# Patient Record
Sex: MALE | Race: WHITE | NOT HISPANIC OR LATINO | URBAN - METROPOLITAN AREA
[De-identification: names, ages, dates, MRNs, and addresses within clinical notes are randomized per-mention and may not be internally consistent; named-entity substitution may affect disease eponyms.]

---

## 2021-07-27 ENCOUNTER — EMERGENCY (EMERGENCY)
Facility: HOSPITAL | Age: 81
LOS: 1 days | Discharge: HOME | End: 2021-07-27
Attending: EMERGENCY MEDICINE | Admitting: EMERGENCY MEDICINE
Payer: MEDICARE

## 2021-07-27 VITALS
HEART RATE: 67 BPM | DIASTOLIC BLOOD PRESSURE: 93 MMHG | OXYGEN SATURATION: 95 % | TEMPERATURE: 98 F | RESPIRATION RATE: 18 BRPM | SYSTOLIC BLOOD PRESSURE: 190 MMHG

## 2021-07-27 VITALS
SYSTOLIC BLOOD PRESSURE: 194 MMHG | TEMPERATURE: 98 F | OXYGEN SATURATION: 97 % | RESPIRATION RATE: 20 BRPM | HEART RATE: 70 BPM | DIASTOLIC BLOOD PRESSURE: 75 MMHG

## 2021-07-27 DIAGNOSIS — G20 PARKINSON'S DISEASE: ICD-10-CM
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
## 2021-07-27 DIAGNOSIS — Z23 ENCOUNTER FOR IMMUNIZATION: ICD-10-CM

## 2021-07-27 DIAGNOSIS — I10 ESSENTIAL (PRIMARY) HYPERTENSION: ICD-10-CM

## 2021-07-27 DIAGNOSIS — S51.012A LACERATION WITHOUT FOREIGN BODY OF LEFT ELBOW, INITIAL ENCOUNTER: ICD-10-CM
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
## 2021-07-27 PROCEDURE — 99283 EMERGENCY DEPT VISIT LOW MDM: CPT | Mod: 25

## 2021-07-27 PROCEDURE — 90471 IMMUNIZATION ADMIN: CPT

## 2021-07-27 PROCEDURE — 13121 CMPLX RPR S/A/L 2.6-7.5 CM: CPT

## 2021-07-27 PROCEDURE — 90715 TDAP VACCINE 7 YRS/> IM: CPT

## 2021-07-27 PROCEDURE — 73080 X-RAY EXAM OF ELBOW: CPT

## 2021-07-27 PROCEDURE — 73080 X-RAY EXAM OF ELBOW: CPT | Mod: 26,LT

## 2021-07-27 PROCEDURE — 12002 RPR S/N/AX/GEN/TRNK2.6-7.5CM: CPT

## 2021-07-27 RX ORDER — TETANUS TOXOID, REDUCED DIPHTHERIA TOXOID AND ACELLULAR PERTUSSIS VACCINE, ADSORBED 5; 2.5; 8; 8; 2.5 [IU]/.5ML; [IU]/.5ML; UG/.5ML; UG/.5ML; UG/.5ML
0.5 SUSPENSION INTRAMUSCULAR ONCE
Refills: 0 | Status: COMPLETED | OUTPATIENT
Start: 2021-07-27 | End: 2021-07-27

## 2021-07-27 RX ADMIN — TETANUS TOXOID, REDUCED DIPHTHERIA TOXOID AND ACELLULAR PERTUSSIS VACCINE, ADSORBED 0.5 MILLILITER(S): 5; 2.5; 8; 8; 2.5 SUSPENSION INTRAMUSCULAR at 17:11

## 2021-07-27 NOTE — ED PROVIDER NOTE - CLINICAL SUMMARY MEDICAL DECISION MAKING FREE TEXT BOX
81 y/o m hx Parkinson's presents c/o mechanical fall today with left elbow lac; ext NVI, no head trauma, xray neg.  Tetanus updated, lac repaired, will d/c with wound care instructions, f/u in 2 weeks for suture removal

## 2021-07-27 NOTE — ED PROVIDER NOTE - ATTENDING CONTRIBUTION TO CARE
79 yo M with hx of Parkinsons (uses walker but noncompliant) accompanied by wife for evaluation of fall that occurred prior to arrival while attempting to button pants by self in bathroom landing on L elbow.  No head strike, other injury, trauma or pain.  L elbow shows lateral posterior L shaped 4cm lac, superficial to subcut tissue.  Mild ttp to elbow.  All other bony prominences and nontender.  No signs of ich.  FROM neck, no spine ttp.  Plan UD TDAP, xray and repair wounds.  Pt appears to have enough ambulation assistance for safe dc.

## 2021-07-27 NOTE — ED ADULT NURSE NOTE - NS ED NURSE DC INFO COMPLEXITY
may take Tylenol as needed for groin soreness; if pain persists or worsens or is associated with other symptoms please contact your cardiologist
Simple: Patient demonstrates quick and easy understanding/Verbalized Understanding

## 2021-07-27 NOTE — ED PROVIDER NOTE - MUSCULOSKELETAL, MLM
left elbow +4 cm L shaped laceration to posterolateral aspect with +mild surrounding tenderness, no swelling or deformity, flexion/extension and supination/pronation of left elbow intact, radial pulse 2+, strength 5/5, sensation intact distally

## 2021-07-27 NOTE — ED PROVIDER NOTE - PATIENT PORTAL LINK FT
You can access the FollowMyHealth Patient Portal offered by [facility] by registering at the following website: [email or web address]. By joining [employer]’s FollowMyHealth portal, you will also be able to view your health information using other applications (apps) compatible with our system.

## 2021-07-27 NOTE — ED ADULT NURSE NOTE - OBJECTIVE STATEMENT
Pt reports fall today in bathroom "because of my Parkinson's" pt denies hitting head, no loc. Pt has skin tear to left elbow.

## 2021-07-27 NOTE — ED PROVIDER NOTE - OBJECTIVE STATEMENT
Patient is an 79 y/o male with PMHx of Parkinson's and HTN presenting with left elbow laceration from a fall this afternoon. Pt states he was standing in the bathroom when he lost his balance, fell backwards and hit his left elbow only on the door hinge. Pt reports unsteady gait as his baseline for several years due to Parkinson's. Pt's wife states this is the 3rd fall in 2 months due to unsteadiness, hit his head during the prior falls and did not go to the ED. Denies dizziness, syncope, fever/chills, head injury, headache, general pain, vision abnormalities, chest pain or SOB Patient is an 79 y/o male with PMHx of Parkinson's and HTN presenting with left elbow laceration from a fall this afternoon. Pt states he was standing in the bathroom when he lost his balance, fell backwards and hit his left elbow only on the door hinge. Pt reports unsteady gait as his baseline for several years due to Parkinson's. Pt's wife states this is the 3rd fall in 2 months due to unsteadiness, hit his head during the prior falls and did not go to the ED. Denies dizziness, syncope, fever/chills, head injury, headache, general pain, vision abnormalities, chest pain or SOB, all other ROS negative.

## 2021-08-24 ENCOUNTER — INPATIENT (INPATIENT)
Facility: HOSPITAL | Age: 81
LOS: 2 days | Discharge: HOME | DRG: 917 | End: 2021-08-27
Attending: HOSPITALIST | Admitting: STUDENT IN AN ORGANIZED HEALTH CARE EDUCATION/TRAINING PROGRAM
Payer: MEDICARE

## 2021-08-24 VITALS
DIASTOLIC BLOOD PRESSURE: 84 MMHG | RESPIRATION RATE: 18 BRPM | HEART RATE: 57 BPM | TEMPERATURE: 98 F | SYSTOLIC BLOOD PRESSURE: 153 MMHG | OXYGEN SATURATION: 98 %

## 2021-08-24 LAB
ALBUMIN SERPL ELPH-MCNC: 3.7 G/DL — SIGNIFICANT CHANGE UP (ref 3.3–5)
ALP SERPL-CCNC: 272 U/L — HIGH (ref 40–120)
ALT FLD-CCNC: 377 U/L — HIGH (ref 10–45)
AMMONIA BLD-MCNC: 24 UMOL/L — SIGNIFICANT CHANGE UP (ref 11–55)
AMPHET UR-MCNC: NEGATIVE — SIGNIFICANT CHANGE UP
AMYLASE P1 CFR SERPL: 625 U/L — HIGH (ref 25–125)
ANION GAP SERPL CALC-SCNC: 4 MMOL/L — LOW (ref 5–17)
APPEARANCE UR: CLEAR — SIGNIFICANT CHANGE UP
APTT BLD: 28 SEC — SIGNIFICANT CHANGE UP (ref 27.5–35.5)
AST SERPL-CCNC: 496 U/L — HIGH (ref 10–40)
BACTERIA # UR AUTO: PRESENT /HPF
BARBITURATES UR SCN-MCNC: NEGATIVE — SIGNIFICANT CHANGE UP
BASE EXCESS BLDV CALC-SCNC: 1.6 MMOL/L — SIGNIFICANT CHANGE UP (ref -2–3)
BASE EXCESS BLDV CALC-SCNC: 2.2 MMOL/L — SIGNIFICANT CHANGE UP (ref -2–3)
BASOPHILS # BLD AUTO: 0.04 K/UL — SIGNIFICANT CHANGE UP (ref 0–0.2)
BASOPHILS NFR BLD AUTO: 0.6 % — SIGNIFICANT CHANGE UP (ref 0–2)
BENZODIAZ UR-MCNC: POSITIVE
BILIRUB SERPL-MCNC: 3.3 MG/DL — HIGH (ref 0.2–1.2)
BILIRUB UR-MCNC: ABNORMAL
BLD GP AB SCN SERPL QL: NEGATIVE — SIGNIFICANT CHANGE UP
BUN SERPL-MCNC: 20 MG/DL — SIGNIFICANT CHANGE UP (ref 7–23)
CA-I SERPL-SCNC: 1.25 MMOL/L — SIGNIFICANT CHANGE UP (ref 1.15–1.33)
CALCIUM SERPL-MCNC: 9.5 MG/DL — SIGNIFICANT CHANGE UP (ref 8.4–10.5)
CHLORIDE SERPL-SCNC: 106 MMOL/L — SIGNIFICANT CHANGE UP (ref 96–108)
CHOLEST SERPL-MCNC: 183 MG/DL — SIGNIFICANT CHANGE UP
CO2 BLDV-SCNC: 29.8 MMOL/L — HIGH (ref 22–26)
CO2 BLDV-SCNC: 31 MMOL/L — HIGH (ref 22–26)
CO2 SERPL-SCNC: 28 MMOL/L — SIGNIFICANT CHANGE UP (ref 22–31)
COCAINE METAB.OTHER UR-MCNC: NEGATIVE — SIGNIFICANT CHANGE UP
COLOR SPEC: YELLOW — SIGNIFICANT CHANGE UP
CREAT SERPL-MCNC: 1.09 MG/DL — SIGNIFICANT CHANGE UP (ref 0.5–1.3)
DIFF PNL FLD: NEGATIVE — SIGNIFICANT CHANGE UP
EOSINOPHIL # BLD AUTO: 0.26 K/UL — SIGNIFICANT CHANGE UP (ref 0–0.5)
EOSINOPHIL NFR BLD AUTO: 4.1 % — SIGNIFICANT CHANGE UP (ref 0–6)
EPI CELLS # UR: SIGNIFICANT CHANGE UP /HPF (ref 0–5)
ETHANOL SERPL-MCNC: <10 MG/DL — SIGNIFICANT CHANGE UP (ref 0–10)
GAS PNL BLDV: 138 MMOL/L — SIGNIFICANT CHANGE UP (ref 136–145)
GAS PNL BLDV: SIGNIFICANT CHANGE UP
GAS PNL BLDV: SIGNIFICANT CHANGE UP
GLUCOSE SERPL-MCNC: 104 MG/DL — HIGH (ref 70–99)
GLUCOSE UR QL: NEGATIVE — SIGNIFICANT CHANGE UP
HCO3 BLDV-SCNC: 28 MMOL/L — SIGNIFICANT CHANGE UP (ref 22–29)
HCO3 BLDV-SCNC: 29 MMOL/L — SIGNIFICANT CHANGE UP (ref 22–29)
HCT VFR BLD CALC: 41.6 % — SIGNIFICANT CHANGE UP (ref 39–50)
HDLC SERPL-MCNC: 62 MG/DL — SIGNIFICANT CHANGE UP
HGB BLD-MCNC: 13.4 G/DL — SIGNIFICANT CHANGE UP (ref 13–17)
IMM GRANULOCYTES NFR BLD AUTO: 0.5 % — SIGNIFICANT CHANGE UP (ref 0–1.5)
INR BLD: 0.97 — SIGNIFICANT CHANGE UP (ref 0.88–1.16)
KETONES UR-MCNC: NEGATIVE — SIGNIFICANT CHANGE UP
LACTATE SERPL-SCNC: 0.9 MMOL/L — SIGNIFICANT CHANGE UP (ref 0.5–2)
LEUKOCYTE ESTERASE UR-ACNC: NEGATIVE — SIGNIFICANT CHANGE UP
LIDOCAIN IGE QN: 1065 U/L — HIGH (ref 7–60)
LIPID PNL WITH DIRECT LDL SERPL: 103 MG/DL — HIGH
LYMPHOCYTES # BLD AUTO: 0.81 K/UL — LOW (ref 1–3.3)
LYMPHOCYTES # BLD AUTO: 12.8 % — LOW (ref 13–44)
MCHC RBC-ENTMCNC: 30.6 PG — SIGNIFICANT CHANGE UP (ref 27–34)
MCHC RBC-ENTMCNC: 32.2 GM/DL — SIGNIFICANT CHANGE UP (ref 32–36)
MCV RBC AUTO: 95 FL — SIGNIFICANT CHANGE UP (ref 80–100)
METHADONE UR-MCNC: NEGATIVE — SIGNIFICANT CHANGE UP
MONOCYTES # BLD AUTO: 0.6 K/UL — SIGNIFICANT CHANGE UP (ref 0–0.9)
MONOCYTES NFR BLD AUTO: 9.4 % — SIGNIFICANT CHANGE UP (ref 2–14)
NEUTROPHILS # BLD AUTO: 4.61 K/UL — SIGNIFICANT CHANGE UP (ref 1.8–7.4)
NEUTROPHILS NFR BLD AUTO: 72.6 % — SIGNIFICANT CHANGE UP (ref 43–77)
NITRITE UR-MCNC: POSITIVE
NON HDL CHOLESTEROL: 121 MG/DL — SIGNIFICANT CHANGE UP
NRBC # BLD: 0 /100 WBCS — SIGNIFICANT CHANGE UP (ref 0–0)
OPIATES UR-MCNC: NEGATIVE — SIGNIFICANT CHANGE UP
PCO2 BLDV: 49 MMHG — SIGNIFICANT CHANGE UP (ref 42–55)
PCO2 BLDV: 58 MMHG — HIGH (ref 42–55)
PCP SPEC-MCNC: SIGNIFICANT CHANGE UP
PCP UR-MCNC: NEGATIVE — SIGNIFICANT CHANGE UP
PH BLDV: 7.31 — LOW (ref 7.32–7.43)
PH BLDV: 7.37 — SIGNIFICANT CHANGE UP (ref 7.32–7.43)
PH UR: 7 — SIGNIFICANT CHANGE UP (ref 5–8)
PLATELET # BLD AUTO: 163 K/UL — SIGNIFICANT CHANGE UP (ref 150–400)
PO2 BLDV: <35 MMHG — LOW (ref 25–45)
PO2 BLDV: <35 MMHG — SIGNIFICANT CHANGE UP (ref 25–45)
POTASSIUM BLDV-SCNC: 4.4 MMOL/L — SIGNIFICANT CHANGE UP (ref 3.5–5.1)
POTASSIUM SERPL-MCNC: 4.4 MMOL/L — SIGNIFICANT CHANGE UP (ref 3.5–5.3)
POTASSIUM SERPL-SCNC: 4.4 MMOL/L — SIGNIFICANT CHANGE UP (ref 3.5–5.3)
PROT SERPL-MCNC: 6.8 G/DL — SIGNIFICANT CHANGE UP (ref 6–8.3)
PROT UR-MCNC: NEGATIVE MG/DL — SIGNIFICANT CHANGE UP
PROTHROM AB SERPL-ACNC: 11.6 SEC — SIGNIFICANT CHANGE UP (ref 10.6–13.6)
RBC # BLD: 4.38 M/UL — SIGNIFICANT CHANGE UP (ref 4.2–5.8)
RBC # FLD: 12.9 % — SIGNIFICANT CHANGE UP (ref 10.3–14.5)
RBC CASTS # UR COMP ASSIST: < 5 /HPF — SIGNIFICANT CHANGE UP
RH IG SCN BLD-IMP: NEGATIVE — SIGNIFICANT CHANGE UP
SAO2 % BLDV: 31.5 % — LOW (ref 67–88)
SAO2 % BLDV: 54.5 % — LOW (ref 67–88)
SARS-COV-2 RNA SPEC QL NAA+PROBE: SIGNIFICANT CHANGE UP
SODIUM SERPL-SCNC: 138 MMOL/L — SIGNIFICANT CHANGE UP (ref 135–145)
SP GR SPEC: 1.01 — SIGNIFICANT CHANGE UP (ref 1–1.03)
THC UR QL: NEGATIVE — SIGNIFICANT CHANGE UP
TRIGL SERPL-MCNC: 92 MG/DL — SIGNIFICANT CHANGE UP
TROPONIN T SERPL-MCNC: 0.01 NG/ML — SIGNIFICANT CHANGE UP (ref 0–0.01)
UROBILINOGEN FLD QL: 0.2 E.U./DL — SIGNIFICANT CHANGE UP
WBC # BLD: 6.35 K/UL — SIGNIFICANT CHANGE UP (ref 3.8–10.5)
WBC # FLD AUTO: 6.35 K/UL — SIGNIFICANT CHANGE UP (ref 3.8–10.5)
WBC UR QL: < 5 /HPF — SIGNIFICANT CHANGE UP

## 2021-08-24 PROCEDURE — 99292 CRITICAL CARE ADDL 30 MIN: CPT

## 2021-08-24 PROCEDURE — 70496 CT ANGIOGRAPHY HEAD: CPT | Mod: 26,MA

## 2021-08-24 PROCEDURE — 99291 CRITICAL CARE FIRST HOUR: CPT

## 2021-08-24 PROCEDURE — 99223 1ST HOSP IP/OBS HIGH 75: CPT | Mod: GC

## 2021-08-24 PROCEDURE — 93010 ELECTROCARDIOGRAM REPORT: CPT

## 2021-08-24 PROCEDURE — 0042T: CPT

## 2021-08-24 PROCEDURE — 76705 ECHO EXAM OF ABDOMEN: CPT | Mod: 26

## 2021-08-24 PROCEDURE — 71045 X-RAY EXAM CHEST 1 VIEW: CPT | Mod: 26

## 2021-08-24 PROCEDURE — 70498 CT ANGIOGRAPHY NECK: CPT | Mod: 26,MA

## 2021-08-24 RX ORDER — THIAMINE MONONITRATE (VIT B1) 100 MG
500 TABLET ORAL EVERY 24 HOURS
Refills: 0 | Status: DISCONTINUED | OUTPATIENT
Start: 2021-08-24 | End: 2021-08-25

## 2021-08-24 RX ORDER — SODIUM CHLORIDE 9 MG/ML
1000 INJECTION INTRAMUSCULAR; INTRAVENOUS; SUBCUTANEOUS ONCE
Refills: 0 | Status: COMPLETED | OUTPATIENT
Start: 2021-08-24 | End: 2021-08-24

## 2021-08-24 RX ORDER — ENOXAPARIN SODIUM 100 MG/ML
40 INJECTION SUBCUTANEOUS EVERY 24 HOURS
Refills: 0 | Status: DISCONTINUED | OUTPATIENT
Start: 2021-08-25 | End: 2021-08-27

## 2021-08-24 RX ORDER — SODIUM CHLORIDE 9 MG/ML
1000 INJECTION, SOLUTION INTRAVENOUS
Refills: 0 | Status: DISCONTINUED | OUTPATIENT
Start: 2021-08-24 | End: 2021-08-26

## 2021-08-24 RX ADMIN — SODIUM CHLORIDE 50 MILLILITER(S): 9 INJECTION, SOLUTION INTRAVENOUS at 23:30

## 2021-08-24 RX ADMIN — SODIUM CHLORIDE 1000 MILLILITER(S): 9 INJECTION INTRAMUSCULAR; INTRAVENOUS; SUBCUTANEOUS at 18:47

## 2021-08-24 NOTE — H&P ADULT - NSHPPHYSICALEXAM_GEN_ALL_CORE
General: Stuporous   HEENT:  JUSTEN, + gag reflex, negative kernig's sign               Lungs: CTAB  Cardiovascular: sinus rate, regular rhythm, no m/r/g  Gastrointestinal: Soft, Positive BS. TTP RUQ   Musculoskeletal:  Moves all extremities to noxious stimuli    Skin: Warm.  Intact  Neurological: No facial asymmetry

## 2021-08-24 NOTE — ED PROVIDER NOTE - OBJECTIVE STATEMENT
Pt w/ PMHx PD (baseline confusion A&O x 1-2, ambulates w/ cane, on Carbidopa-Levodopa), HTN on Lisinopril 5 mg, Depression /anxiety on Lorazepam unknown dose, Desvenlafaxine unknown dose, recent frequent falls in the past 2 months (no prior head imaging performed), and started on Seroquel 25 mg because he was having visual hallucinations. Pt w/ PMHx PD (baseline confusion A&O x 1-2, ambulates w/ cane, on Carbidopa-Levodopa), HTN on Lisinopril 5 mg, Depression /anxiety (on Lorazepam unknown dose, Desvenlafaxine unknown dose), recent frequent falls in the past 2 months (no prior head imaging performed), and started on Seroquel 25 mg because he was having visual hallucinations, now p/w AMS. Wife reports pt w/ increasing confusion, noted last night around 9pm. Around 4 am pt awoke c/o abd pain, was given PeptoBismol. Wife reports she went back to sleep, and upon her awakening this morning, pt was still asleep. Pt was sleeping throughout the whole, difficult to arouse, prompting ED visit. No noted f/c, cough/URI sx, n/v/d, or changes in urine output. She reports he may have accidentally taken additional meds last night. No known prior hx CVA.

## 2021-08-24 NOTE — CONSULT NOTE ADULT - ASSESSMENT
80y Male with PMHx of Parkinson's disease, multiple falls, HTN, depression, and anxiety presents to the ED with altered mental status. On exam, patient is stuporous, face is symmetrical, extremity strength grossly equal, withdraws to noxious in all four. Per patient's wife at bedside, patient was in his usual state of health last night when he became more confused at 2100, expressed concern about taking his meds before going to bed. Pt woke @?0300 for Pepto-Bismol, took unwitnessed and went to sleep unable to be roused by wife all day. CT head with no acute hemorrhage, only significant for small vessel changes. CT 80y Male with PMHx of Parkinson's disease, multiple falls, HTN, depression, and anxiety presents to the ED with altered mental status. On exam, patient is stuporous, face is symmetrical, extremity strength grossly equal, withdraws to noxious in all four. Per patient's wife at bedside, patient was in his usual state of health last night when he became more confused at 2100, expressed concern about taking his meds before going to bed. Pt woke @?0300 for Pepto-Bismol, took unwitnessed and went to sleep unable to be roused by wife all day. CT head with no acute hemorrhage, only significant for small vessel changes, CTP negative. Contacted by attending radiologist MD Modi regarding CTA head which shows abrupt lack of contrast filling in the right posterior cerebral artery at the proximal P2 segment for a span of approximately 4 mm (series 8, images 51-52). Filling defect appearance is suggestive of a recent thromboembolic event.       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, but highly unlikely as explanation for patient's mental status   - Not a tPA or thrombectomy candidate as is small vessel w/no perfusion mismatch    - Recommend ICU/medicine consult elevated LFTs, abdominal pain, and possible toxic metabolic syndrome   - MR head non con if high suspicion for stroke   - Recommend starting ASA 81 mg, Plavix 75 mg, and high dose statin  - Will need NGT if fails dysphagia for Parkinson's meds   - Stroke neurology will continue to follow      Discussed with neurology attending MD Cruz  80y Male with PMHx of Parkinson's disease, multiple falls, HTN, depression, and anxiety presents to the ED with altered mental status. On exam, patient is stuporous, face is symmetrical, extremity strength grossly equal, withdraws to noxious in all four. Per patient's wife at bedside, patient was in his usual state of health last night when he became more confused at 2100, expressed concern about taking his meds before going to bed. Pt woke @?0300 for Pepto-Bismol, took unwitnessed and went to sleep unable to be roused by wife all day. CT head with no acute hemorrhage, only significant for small vessel changes, CTP negative. Contacted by attending radiologist MD Modi regarding CTA head which shows abrupt lack of contrast filling in the right posterior cerebral artery at the proximal P2 segment for a span of approximately 4 mm (series 8, images 51-52). Filling defect appearance is suggestive of a recent thromboembolic event.       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 (unclear if it is chronic), but highly unlikely as explanation for patient's mental status   - Not a tPA or thrombectomy candidate as is small vessel w/no perfusion mismatch    - Recommend ICU/medicine consult elevated LFTs, abdominal pain, and possible toxic metabolic syndrome   - MR head non con if high suspicion for stroke   - Recommend starting ASA 81 mg, Plavix 75 mg, and high dose statin  - Will need NGT if fails dysphagia for Parkinson's meds   -Recommend stroke neuro checks q4 hours/vital signs q4 hours  -Stroke neurology will continue to follow  -Stroke education      Discussed with neurology attending MD Cruz

## 2021-08-24 NOTE — CONSULT NOTE ADULT - ASSESSMENT
80y Male with PMHx of Parkinson's disease, multiple falls, HTN, depression, and anxiety presents to the ED with altered mental status likely i/s/o medication interaction/overuse vs. non-convulsive seizures    NEURO:  #TME   Pt has been stupor all day per wife, with last seen normal at 9pm 8/23. Given home meds strong suspicion for medication interaction or over use as cause. Given negative lactate low s/f for convulsive seizure but c/f non-convulsive leading to post-ictal state. Lower s/f for intraabdominal pathology causing encephalopathy given lack of SIRS.   - EEG  - TSH, B12, folate, RPR  - Thiamine, wernicke's dosing given remote hx of etoh  - Hold all home meds     CV:  #Sinus luis eduardo w/ 1 AVB  No QTc prolongation     #HTNsive  BP initially in 200s, now in 120s. Lisinopril 5mg QD at home.   - Acceptable BP in 160s     PULM:  #Aspiration precautions     GI:  #Elevated bili and transaminases  Abd U/S showing normal CBD size and no e/o cholecystitis. No sepsis present so low c/f cholangitis  - Trend LFTs  - F/u lipase/amylase  - NPO    F: tolerating PO, no IVF  E: replete K<4, Mg<2  N: Dash/TLC    VTE Prophylaxis: Lovenox SubQ  C: Full Code  D: 7L      Discussed with Dr. Rodriguez

## 2021-08-24 NOTE — ED PROVIDER NOTE - PROGRESS NOTE DETAILS
Pt seen by stroke, d/w neurorads, occlusion chronic. Does not feel this explains AMS, which more likely tox / metabolic. Pt seen by MICU team, admit to [facility], Dr Bachan Pt seen by stroke, d/w neurorads, occlusion appears chronic a/p stroke, and does not feel this explains pt's AMS, which more likely tox / metabolic in nature. RUQ performed given elevated LFTs and wife's report of abd pain, although no abd pain or ttp elicited on exam. Afebrile in ED. No sig leukocytosis, shift, nor lactic acidosis to suggest infection. US reviewed: cholelithiasis w/o pericholecystic fluid. Pending radiology read. Pt seen by MICU team, admit to [facility], Dr Bachan

## 2021-08-24 NOTE — ED ADULT TRIAGE NOTE - CHIEF COMPLAINT QUOTE
Pt BIBEMS from home for evaluation of lethargy. As per wife, "He might have taken too much of his Ativan." Pt arousable to painful stimuli, but not verbal.

## 2021-08-24 NOTE — H&P ADULT - NSHPLABSRESULTS_GEN_ALL_CORE
.  LABS:                         13.4   6.35  )-----------( 163      ( 24 Aug 2021 17:53 )             41.6         138  |  106  |  20  ----------------------------<  104<H>  4.4   |  28  |  1.09    Ca    9.5      24 Aug 2021 17:53    TPro  6.8  /  Alb  3.7  /  TBili  3.3<H>  /  DBili  x   /  AST  496<H>  /  ALT  377<H>  /  AlkPhos  272<H>      PT/INR - ( 24 Aug 2021 17:53 )   PT: 11.6 sec;   INR: 0.97          PTT - ( 24 Aug 2021 17:53 )  PTT:28.0 sec  Urinalysis Basic - ( 24 Aug 2021 18:43 )    Color: Yellow / Appearance: Clear / S.015 / pH: x  Gluc: x / Ketone: NEGATIVE  / Bili: Small / Urobili: 0.2 E.U./dL   Blood: x / Protein: NEGATIVE mg/dL / Nitrite: POSITIVE   Leuk Esterase: NEGATIVE / RBC: < 5 /HPF / WBC < 5 /HPF   Sq Epi: x / Non Sq Epi: 0-5 /HPF / Bacteria: Present /HPF      CARDIAC MARKERS ( 24 Aug 2021 17:53 )  x     / 0.01 ng/mL / x     / x     / x            Lactate, Blood: 0.9 mmol/L ( @ 17:51)      RADIOLOGY, EKG & ADDITIONAL TESTS: Reviewed. LABS:                        13.4   6.35  )-----------( 163      ( 24 Aug 2021 17:53 )             41.6         138  |  106  |  20  ----------------------------<  104<H>  4.4   |  28  |  1.09    Ca    9.5      24 Aug 2021 17:53    TPro  6.8  /  Alb  3.7  /  TBili  3.3<H>  /  DBili  x   /  AST  496<H>  /  ALT  377<H>  /  AlkPhos  272<H>      PT/INR - ( 24 Aug 2021 17:53 )   PT: 11.6 sec;   INR: 0.97          PTT - ( 24 Aug 2021 17:53 )  PTT:28.0 sec  Urinalysis Basic - ( 24 Aug 2021 18:43 )    Color: Yellow / Appearance: Clear / S.015 / pH: x  Gluc: x / Ketone: NEGATIVE  / Bili: Small / Urobili: 0.2 E.U./dL   Blood: x / Protein: NEGATIVE mg/dL / Nitrite: POSITIVE   Leuk Esterase: NEGATIVE / RBC: < 5 /HPF / WBC < 5 /HPF   Sq Epi: x / Non Sq Epi: 0-5 /HPF / Bacteria: Present /HPF      LIVER FUNCTIONS - ( 24 Aug 2021 17:53 )  Alb: 3.7 g/dL / Pro: 6.8 g/dL / ALK PHOS: 272 U/L / ALT: 377 U/L / AST: 496 U/L / GGT: x

## 2021-08-24 NOTE — H&P ADULT - HISTORY OF PRESENT ILLNESS
80y Male with PMHx of Parkinson's disease, multiple falls, HTN, depression, and anxiety presents to the ED with altered mental status. Collateral obtained by ED from pt's wife who was no longer present upon ICU consult arrival. Per patient's wife, patient was in his usual state of health last night when he became more confused at 2100 asking where family members were that no longer lived with them. The patient's wife also states that pt usually manages his own oral medications, but last night he expressed that he might need help moving forward. The patient asked his wife sometime between 0200 and 0400 for pepto bismal which he may have taken, but it was unwitnessed. After this encounter, the patient went back to sleep, and he did not wake up for the rest of the day despite his wife attempting to wake him. There have been reported increased falls over the past few months and was recently started on 25mg seroquel at home. No reported melena, hematochezia, N/V, hematemesis.    ED vitals: T Afeb   HR 50-60s   RR 18  /94-->164/82  SpO2 98  Labs signficant: No WBC, no lactate, no electrolyte abnormalities. Elevated bilirubin to 3.3, AST//377  Imaging/EKG: Sinus luis eduardo with 1 AVB, no ischemic changes   Interventions: 1L NS

## 2021-08-24 NOTE — CONSULT NOTE ADULT - ATTENDING COMMENTS
Libby Gold [id number]  This patient was evaluated with the resident, management decisions made, see above for the detail, I agree with the A/P.  An 86 y/o male with Parkinson’s, depression, HTN whose baseline mentation is orientation 1-2, takes his own medications, noted to be more somnolent, ems called and brought him to the ED.  ED  mmhg ->160 mmhg without treatment, CT head with no acute changes, (+)GAG, bili 3.3, , , ammonia 24.  -AMS, stupor –multifactorial, also on bzd, Seroquel (recently started)  -Parkinson’s Disease  -Bradycardia, asymptomatic  -elevated transaminases  -elevated bili  >no opiates, no sedatives, d/c Ativan. d/c Seroquel  >accept SBP up to 160mmHg  >IVF  See the resident’s note above for the rest of the details.  Transfer to [facility].

## 2021-08-24 NOTE — CONSULT NOTE ADULT - SUBJECTIVE AND OBJECTIVE BOX
**STROKE CODE CONSULT NOTE**    Last known well time/Time of onset of symptoms:   8/23 at 2100    HPI: 80y Male with PMHx of Parkinson's disease, multiple falls, HTN, depression, and anxiety presents to the ED with altered mental status. On exam, patient is stuporous, face is symmetrical, extremity strength grossly equal, withdraws to noxious in all four. Per patient's wife at bedside, patient was in his usual state of health last night when he became more confused at 2100 asking where family members were that no longer lived with them. The patient's wife also states that pt usually manages his own oral medications, but last night he expressed that he might need help moving forward. The patient asked his wife sometime between 0200 and 0400 for pepto bismal which he may have taken, but it was unwitnessed. After this encounter, the patient went back to sleep, and he did not wake up for the rest of the day despite his wife attempting to wake him. CT head with no acute hemorrhage, only significant for small vessel changes.     T(C): 36.6 (08-24-21 @ 17:34), Max: 36.6 (08-24-21 @ 17:34)  HR: 61 (08-24-21 @ 18:19) (57 - 61)  BP: 218/94 (08-24-21 @ 18:19) (153/84 - 218/94)  RR: 18 (08-24-21 @ 18:19) (18 - 18)  SpO2: 98% (08-24-21 @ 18:19) (98% - 98%)    PAST MEDICAL & SURGICAL HISTORY:  Parkinsons  HTN  Depression  Anxiety  Left leg torn tendon s/p surgery       SOCIAL HISTORY:   Patient lives with wife  Denies, smoking, drinking, illicit drug use    ROS:  Constitutional: No fever, weight loss or fatigue  Eyes: No eye pain, visual disturbances, or discharge  ENMT:  No difficulty hearing, tinnitus; No sinus or throat pain  Neck: No pain or stiffness  Respiratory: No cough, wheezing, chills or hemoptysis  Cardiovascular: No chest pain, palpitations, shortness of breath, or leg swelling  Gastrointestinal: No abdominal pain. No nausea, vomiting or hematemesis; No diarrhea or constipation. Nohematochezia.  Genitourinary: No dysuria, frequency, hematuria or incontinence  Neurological: As per HPI  Skin: No itching, burning, rashes or lesions   Endocrine: No heat or cold intolerance; No hair loss  Musculoskeletal: No joint pain or swelling; No muscle, back or extremity pain  Heme/Lymph: No easy bruising or bleeding gums    MEDICATIONS  (STANDING):  sodium chloride 0.9% Bolus 1000 milliLiter(s) IV Bolus once    MEDICATIONS  (PRN):    Allergies    No Known Allergies    Intolerances      Vital Signs Last 24 Hrs  T(C): 36.6 (24 Aug 2021 17:34), Max: 36.6 (24 Aug 2021 17:34)  T(F): 97.8 (24 Aug 2021 17:34), Max: 97.8 (24 Aug 2021 17:34)  HR: 61 (24 Aug 2021 18:19) (57 - 61)  BP: 218/94 (24 Aug 2021 18:19) (153/84 - 218/94)  BP(mean): --  RR: 18 (24 Aug 2021 18:19) (18 - 18)  SpO2: 98% (24 Aug 2021 18:19) (98% - 98%)    Physical exam:  Constitutional: No acute distress, conversant  Eyes: Anicteric sclerae, moist conjunctivae, see below for CNs  Neck: trachea midline, FROM, supple, no thyromegaly or lymphadenopathy  Cardiovascular: Regular rate and rhythm, no murmurs, rubs, or gallops. No carotid bruits.   Pulmonary: Anterior breath sounds clear bilaterally, no crackles or wheezing. No use of accessory muscles  GI: Abdomen soft, non-distended, non-tender  Extremities: Radial and DP pulses +2, no edema    Neurologic:  -Mental status: Awake, alert, oriented to person, place, and time. Speech is fluent with intact naming, repetition, and comprehension, no dysarthria. Recent and remote memory intact. Follows commands. Attention/concentration intact. Fund of knowledge appropriate.  -Cranial nerves:   II: Visual fields are full to confrontation.  III, IV, VI: Extraocular movements are intact without nystagmus. Pupils equally round and reactive to light  V:  Facial sensation V1-V3 equal and intact   VII: Face is symmetric with normal eye closure and smile  VIII: Hearing is bilaterally intact to finger rub  IX, X: Uvula is midline and soft palate rises symmetrically  XI: Head turning and shoulder shrug are intact.  XII: Tongue protrudes midline  Motor: Normal bulk and tone. No pronator drift. Strength bilateral upper extremity 5/5, bilateral lower extremities 5/5.  Rapid alternating movements intact and symmetric  Sensation: Intact to light touch bilaterally. No neglect or extinction on double simultaneous testing.  Coordination: No dysmetria on finger-to-nose and heel-to-shin bilaterally  Reflexes: Downgoing toes bilaterally   Gait: Narrow gait and steady    NIHSS: **** ASPECT Score: ***** ICH Score: ****** (GCS)    Fingerstick Blood Glucose: CAPILLARY BLOOD GLUCOSE  91 (24 Aug 2021 18:17)      POCT Blood Glucose.: 91 mg/dL (24 Aug 2021 17:34)    LABS:                        13.4   6.35  )-----------( 163      ( 24 Aug 2021 17:53 )             41.6     08-24    138  |  106  |  20  ----------------------------<  104<H>  4.4   |  28  |  1.09    Ca    9.5      24 Aug 2021 17:53    TPro  6.8  /  Alb  3.7  /  TBili  3.3<H>  /  DBili  x   /  AST  496<H>  /  ALT  377<H>  /  AlkPhos  272<H>  08-24    PT/INR - ( 24 Aug 2021 17:53 )   PT: 11.6 sec;   INR: 0.97          PTT - ( 24 Aug 2021 17:53 )  PTT:28.0 sec  CARDIAC MARKERS ( 24 Aug 2021 17:53 )  x     / 0.01 ng/mL / x     / x     / x              RADIOLOGY & ADDITIONAL STUDIES:      -----------------------------------------------------------------------------------------------------------------  IV-tPA (Y/N):    ***                              Bolus time:    Alteplase Dose Verification w/ RN:  Reason IV-tPA not given: ***    **STROKE CODE CONSULT NOTE**    Last known well time/Time of onset of symptoms:   8/23 at 2100    HPI: 80y Male with PMHx of Parkinson's disease, multiple falls, HTN, depression, and anxiety presents to the ED with altered mental status. On exam, patient is stuporous, face is symmetrical, extremity strength grossly equal, withdraws to noxious in all four. Per patient's wife at bedside, patient was in his usual state of health last night when he became more confused at 2100 asking where family members were that no longer lived with them. The patient's wife also states that pt usually manages his own oral medications, but last night he expressed that he might need help moving forward. The patient asked his wife sometime between 0200 and 0400 for pepto bismal which he may have taken, but it was unwitnessed. After this encounter, the patient went back to sleep, and he did not wake up for the rest of the day despite his wife attempting to wake him. CT head with no acute hemorrhage, only significant for small vessel changes.     T(C): 36.6 (08-24-21 @ 17:34), Max: 36.6 (08-24-21 @ 17:34)  HR: 61 (08-24-21 @ 18:19) (57 - 61)  BP: 218/94 (08-24-21 @ 18:19) (153/84 - 218/94)  RR: 18 (08-24-21 @ 18:19) (18 - 18)  SpO2: 98% (08-24-21 @ 18:19) (98% - 98%)    PAST MEDICAL & SURGICAL HISTORY:  Parkinsons  HTN  Depression  Anxiety  Left leg torn tendon s/p surgery       SOCIAL HISTORY:   Patient lives with wife  Denies, smoking, drinking, illicit drug use      ROS:  Unable to obtain due to stupor       MEDICATIONS  (STANDING):  sodium chloride 0.9% Bolus 1000 milliLiter(s) IV Bolus once      Allergies    No Known Allergies    Intolerances      Vital Signs Last 24 Hrs  T(C): 36.6 (24 Aug 2021 17:34), Max: 36.6 (24 Aug 2021 17:34)  T(F): 97.8 (24 Aug 2021 17:34), Max: 97.8 (24 Aug 2021 17:34)  HR: 61 (24 Aug 2021 18:19) (57 - 61)  BP: 218/94 (24 Aug 2021 18:19) (153/84 - 218/94)  BP(mean): --  RR: 18 (24 Aug 2021 18:19) (18 - 18)  SpO2: 98% (24 Aug 2021 18:19) (98% - 98%)      Physical exam:  Constitutional: Stuporous, responds to noxious   Eyes: Anicteric sclerae, moist conjunctivae, see below for CNs  Neck: trachea midline, supple  Cardiovascular: Regular rate and rhythm  Pulmonary: No use of accessory muscles  GI: Abdomen soft, non-distended, non-tender  Extremities: Radial and DP pulses +2, no edema        Neurologic:  -Mental status: Stuporous, mute, only grunting. Able to hold arms up, but uncertain if to command.   -Cranial nerves:   II: BTT intact   III, IV, VI: Dolls eyes intact, eyes midline. Pupils equally round and reactive to light  VII: Face is grossly symmetric   XII: Tongue protrudes midline  Motor: Normal bulk and tone. Equivalent bilateral upper extremity drift. All limbs antigravity w/nonpurposeful movement. All withdraw to noxious   Sensation: Grossly intact. Withdraws to noxious   Coordination: unable to assess   Reflexes: Downgoing toes bilaterally       NIHSS: 17    Fingerstick Blood Glucose: CAPILLARY BLOOD GLUCOSE  91 (24 Aug 2021 18:17)      POCT Blood Glucose.: 91 mg/dL (24 Aug 2021 17:34)      LABS:                        13.4   6.35  )-----------( 163      ( 24 Aug 2021 17:53 )             41.6     08-24    138  |  106  |  20  ----------------------------<  104<H>  4.4   |  28  |  1.09    Ca    9.5      24 Aug 2021 17:53    TPro  6.8  /  Alb  3.7  /  TBili  3.3<H>  /  DBili  x   /  AST  496<H>  /  ALT  377<H>  /  AlkPhos  272<H>  08-24    PT/INR - ( 24 Aug 2021 17:53 )   PT: 11.6 sec;   INR: 0.97          PTT - ( 24 Aug 2021 17:53 )  PTT:28.0 sec  CARDIAC MARKERS ( 24 Aug 2021 17:53 )  x     / 0.01 ng/mL / x     / x     / x          RADIOLOGY & ADDITIONAL STUDIES:  < from: CT Angio Head w/ IV Cont (08.24.21 @ 18:15) >  IMPRESSION: The left cervical vertebral artery is severely narrowed throughout, which could represent a combination of underlying development with superimposed atherosclerotic disease.    < end of copied text >    < from: CT Perfusion w/ Maps w/ IV Cont (08.24.21 @ 18:18) >  FINDINGS: The CBF <30% volume is reported as 0 mL.  The Tmax > 6s volume is reported as 0 mL. No mismatched thereof.    IMPRESSION: Negative CT perfusion exam of the brain.    --- End of Report ---    < end of copied text >          -----------------------------------------------------------------------------------------------------------------  IV-tPA (Y/N):    ***                              Bolus time:    Alteplase Dose Verification w/ RN:  Reason IV-tPA not given: ***    **STROKE CODE CONSULT NOTE**    Last known well time/Time of onset of symptoms:   8/23 at 2100    HPI: 80y Male with PMHx of Parkinson's disease, multiple falls, HTN, depression, and anxiety presents to the ED with altered mental status. On exam, patient is stuporous, face is symmetrical, extremity strength grossly equal, withdraws to noxious in all four. Per patient's wife at bedside, patient was in his usual state of health last night when he became more confused at 2100 asking where family members were that no longer lived with them. The patient's wife also states that pt usually manages his own oral medications, but last night he expressed that he might need help moving forward. The patient asked his wife sometime between 0200 and 0400 for pepto bismal which he may have taken, but it was unwitnessed. After this encounter, the patient went back to sleep, and he did not wake up for the rest of the day despite his wife attempting to wake him. CT head with no acute hemorrhage, only significant for small vessel changes.     T(C): 36.6 (08-24-21 @ 17:34), Max: 36.6 (08-24-21 @ 17:34)  HR: 61 (08-24-21 @ 18:19) (57 - 61)  BP: 218/94 (08-24-21 @ 18:19) (153/84 - 218/94)  RR: 18 (08-24-21 @ 18:19) (18 - 18)  SpO2: 98% (08-24-21 @ 18:19) (98% - 98%)    PAST MEDICAL & SURGICAL HISTORY:  Parkinsons  HTN  Depression  Anxiety  Left leg torn tendon s/p surgery       SOCIAL HISTORY:   Patient lives with wife  Denies, smoking, drinking, illicit drug use      ROS:  Unable to obtain due to stupor       MEDICATIONS  (STANDING):  sodium chloride 0.9% Bolus 1000 milliLiter(s) IV Bolus once      Allergies    No Known Allergies    Intolerances      Vital Signs Last 24 Hrs  T(C): 36.6 (24 Aug 2021 17:34), Max: 36.6 (24 Aug 2021 17:34)  T(F): 97.8 (24 Aug 2021 17:34), Max: 97.8 (24 Aug 2021 17:34)  HR: 61 (24 Aug 2021 18:19) (57 - 61)  BP: 218/94 (24 Aug 2021 18:19) (153/84 - 218/94)  BP(mean): --  RR: 18 (24 Aug 2021 18:19) (18 - 18)  SpO2: 98% (24 Aug 2021 18:19) (98% - 98%)      Physical exam:  Constitutional: Stuporous, responds to noxious   Eyes: Anicteric sclerae, moist conjunctivae, see below for CNs  Neck: trachea midline, supple  Cardiovascular: Regular rate and rhythm  Pulmonary: No use of accessory muscles  GI: Abdomen soft, non-distended, non-tender  Extremities: Radial and DP pulses +2, no edema        Neurologic:  -Mental status: Stuporous, mute, only grunting. Able to hold arms up, but uncertain if to command.   -Cranial nerves:   II: BTT intact   III, IV, VI: Dolls eyes intact, eyes midline. Pupils equally round and reactive to light  VII: Face is grossly symmetric   XII: Tongue protrudes midline  Motor: Normal bulk and tone. Equivalent bilateral upper extremity drift. All limbs antigravity w/nonpurposeful movement. All withdraw to noxious   Sensation: Grossly intact. Withdraws to noxious   Coordination: unable to assess   Reflexes: Downgoing toes bilaterally       NIHSS: 17    Fingerstick Blood Glucose: CAPILLARY BLOOD GLUCOSE  91 (24 Aug 2021 18:17)      POCT Blood Glucose.: 91 mg/dL (24 Aug 2021 17:34)      LABS:                        13.4   6.35  )-----------( 163      ( 24 Aug 2021 17:53 )             41.6     08-24    138  |  106  |  20  ----------------------------<  104<H>  4.4   |  28  |  1.09    Ca    9.5      24 Aug 2021 17:53    TPro  6.8  /  Alb  3.7  /  TBili  3.3<H>  /  DBili  x   /  AST  496<H>  /  ALT  377<H>  /  AlkPhos  272<H>  08-24    PT/INR - ( 24 Aug 2021 17:53 )   PT: 11.6 sec;   INR: 0.97          PTT - ( 24 Aug 2021 17:53 )  PTT:28.0 sec  CARDIAC MARKERS ( 24 Aug 2021 17:53 )  x     / 0.01 ng/mL / x     / x     / x          RADIOLOGY & ADDITIONAL STUDIES:  < from: CT Angio Head w/ IV Cont (08.24.21 @ 18:15) >  IMPRESSION: The left cervical vertebral artery is severely narrowed throughout, which could represent a combination of underlying development with superimposed atherosclerotic disease.    < end of copied text >    < from: CT Perfusion w/ Maps w/ IV Cont (08.24.21 @ 18:18) >  FINDINGS: The CBF <30% volume is reported as 0 mL.  The Tmax > 6s volume is reported as 0 mL. No mismatched thereof.    IMPRESSION: Negative CT perfusion exam of the brain.      < end of copied text >      < from: CT Angio Head w/ IV Cont (08.24.21 @ 18:15) >    1. Intracranial CTA shows abrupt lack of contrast filling in the right posterior cerebral artery at the proximal P2 segment for a span of approximately 4 mm (series 8, images 51-52). Filling defect appearance is suggestive of a recent thromboembolic event.    Furthermore, the left posterior cerebral artery P2 segment shows a focal site of moderate there are severe stenosis, likely atherosclerotic in nature.    Also correction to below: There is NO hemodynamically significant stenosis of the internal carotid arteries or its or circulation. Also, the left vertebral artery intracranially and the left PICA fill with contrast, presumably in retrograde supply.    2. Extracranial CTA: Origin of the left vertebral artery is not well seen, especially in the presence of adjacent and more dense incoming venous and craniad-refluxing contrast. However, there is patency seen of the left vertebral artery of the more distal V1 segment, and throughout its V2 and V3 segments, but very faint and discontinuous comparison the more robustly patent right vertebral artery. Greater contrast density is seen distally, suggesting a retrograde source of flow, especially to the left V4 segment and PICA as mentioned above.    Both common carotid arteries, internal carotid arteries, and the right vertebral artery show no site of occlusion, dissection injury nor significant stenosis. There is mild calcified plaque at the left greater than right carotid bifurcations.    < end of copied text >    -----------------------------------------------------------------------------------------------------------------  IV-tPA (Y/N):    No                               Reason IV-tPA not given: Eligibility     **STROKE CODE CONSULT NOTE**    Last known well time/Time of onset of symptoms:   8/23 at 2100    HPI: 80y Male with PMHx of Parkinson's disease, multiple falls, HTN, depression, and anxiety presents to the ED with altered mental status. On exam, patient is stuporous, face is symmetrical, extremity strength grossly equal, withdraws to noxious in all four. Per patient's wife at bedside, patient was in his usual state of health last night when he became more confused at 2100 asking where family members were that no longer lived with them. The patient's wife also states that pt usually manages his own oral medications, but last night he expressed that he might need help moving forward. The patient asked his wife sometime between 0200 and 0400 for pepto bismal which he may have taken, but it was unwitnessed. After this encounter, the patient went back to sleep, and he did not wake up for the rest of the day despite his wife attempting to wake him. CT head with no acute hemorrhage, only significant for small vessel changes.     T(C): 36.6 (08-24-21 @ 17:34), Max: 36.6 (08-24-21 @ 17:34)  HR: 61 (08-24-21 @ 18:19) (57 - 61)  BP: 218/94 (08-24-21 @ 18:19) (153/84 - 218/94)  RR: 18 (08-24-21 @ 18:19) (18 - 18)  SpO2: 98% (08-24-21 @ 18:19) (98% - 98%)    PAST MEDICAL & SURGICAL HISTORY:  Parkinsons  HTN  Depression  Anxiety  Left leg torn tendon s/p surgery       SOCIAL HISTORY:   Patient lives with wife  Denies, smoking, drinking, illicit drug use      ROS:  Unable to obtain due to stupor       MEDICATIONS  (STANDING):  sodium chloride 0.9% Bolus 1000 milliLiter(s) IV Bolus once      Allergies    No Known Allergies    Intolerances      Vital Signs Last 24 Hrs  T(C): 36.6 (24 Aug 2021 17:34), Max: 36.6 (24 Aug 2021 17:34)  T(F): 97.8 (24 Aug 2021 17:34), Max: 97.8 (24 Aug 2021 17:34)  HR: 61 (24 Aug 2021 18:19) (57 - 61)  BP: 218/94 (24 Aug 2021 18:19) (153/84 - 218/94)  BP(mean): --  RR: 18 (24 Aug 2021 18:19) (18 - 18)  SpO2: 98% (24 Aug 2021 18:19) (98% - 98%)      Physical exam:  Constitutional: Stuporous, responds to noxious   Eyes: Anicteric sclerae, moist conjunctivae, see below for CNs  Neck: trachea midline, supple  Cardiovascular: Regular rate and rhythm  Pulmonary: No use of accessory muscles  GI: Abdomen soft, softly distended, +grimace to palpation  Extremities: Radial and DP pulses +2, no edema        Neurologic:  -Mental status: Stuporous, mute, only grunting. Able to hold arms up, but uncertain if to command.   -Cranial nerves:   II: BTT intact   III, IV, VI: Dolls eyes intact, eyes midline. Pupils equally round and reactive to light  VII: Face is grossly symmetric   XII: Tongue protrudes midline  Motor: Normal bulk and tone. Equivalent bilateral upper extremity drift. All limbs antigravity w/nonpurposeful movement. All withdraw to noxious   Sensation: Grossly intact. Withdraws to noxious   Coordination: unable to assess   Reflexes: Downgoing toes bilaterally       NIHSS: 17    Fingerstick Blood Glucose: CAPILLARY BLOOD GLUCOSE  91 (24 Aug 2021 18:17)      POCT Blood Glucose.: 91 mg/dL (24 Aug 2021 17:34)      LABS:                        13.4   6.35  )-----------( 163      ( 24 Aug 2021 17:53 )             41.6     08-24    138  |  106  |  20  ----------------------------<  104<H>  4.4   |  28  |  1.09    Ca    9.5      24 Aug 2021 17:53    TPro  6.8  /  Alb  3.7  /  TBili  3.3<H>  /  DBili  x   /  AST  496<H>  /  ALT  377<H>  /  AlkPhos  272<H>  08-24    PT/INR - ( 24 Aug 2021 17:53 )   PT: 11.6 sec;   INR: 0.97          PTT - ( 24 Aug 2021 17:53 )  PTT:28.0 sec  CARDIAC MARKERS ( 24 Aug 2021 17:53 )  x     / 0.01 ng/mL / x     / x     / x          RADIOLOGY & ADDITIONAL STUDIES:  < from: CT Angio Head w/ IV Cont (08.24.21 @ 18:15) >  IMPRESSION: The left cervical vertebral artery is severely narrowed throughout, which could represent a combination of underlying development with superimposed atherosclerotic disease.    < end of copied text >    < from: CT Perfusion w/ Maps w/ IV Cont (08.24.21 @ 18:18) >  FINDINGS: The CBF <30% volume is reported as 0 mL.  The Tmax > 6s volume is reported as 0 mL. No mismatched thereof.    IMPRESSION: Negative CT perfusion exam of the brain.      < end of copied text >      < from: CT Angio Head w/ IV Cont (08.24.21 @ 18:15) >    1. Intracranial CTA shows abrupt lack of contrast filling in the right posterior cerebral artery at the proximal P2 segment for a span of approximately 4 mm (series 8, images 51-52). Filling defect appearance is suggestive of a recent thromboembolic event.    Furthermore, the left posterior cerebral artery P2 segment shows a focal site of moderate there are severe stenosis, likely atherosclerotic in nature.    Also correction to below: There is NO hemodynamically significant stenosis of the internal carotid arteries or its or circulation. Also, the left vertebral artery intracranially and the left PICA fill with contrast, presumably in retrograde supply.    2. Extracranial CTA: Origin of the left vertebral artery is not well seen, especially in the presence of adjacent and more dense incoming venous and craniad-refluxing contrast. However, there is patency seen of the left vertebral artery of the more distal V1 segment, and throughout its V2 and V3 segments, but very faint and discontinuous comparison the more robustly patent right vertebral artery. Greater contrast density is seen distally, suggesting a retrograde source of flow, especially to the left V4 segment and PICA as mentioned above.    Both common carotid arteries, internal carotid arteries, and the right vertebral artery show no site of occlusion, dissection injury nor significant stenosis. There is mild calcified plaque at the left greater than right carotid bifurcations.    < end of copied text >    -----------------------------------------------------------------------------------------------------------------  IV-tPA (Y/N):    No                               Reason IV-tPA not given: Eligibility

## 2021-08-24 NOTE — H&P ADULT - ATTENDING COMMENTS
This patient was just evaluated as an ICU consult, please refer to that note for the details; the sarah was done on that note.

## 2021-08-24 NOTE — ED PROVIDER NOTE - WR ORDER NAME 1
How Severe Is Your Skin Lesion?: moderate Have Your Skin Lesions Been Treated?: been treated Is This A New Presentation, Or A Follow-Up?: Growths Which Family Member (Optional)?: Maternal Gpa Additional History: Went to UC last night. Is taking antibiotic. Xray Chest 1 View- PORTABLE-Urgent

## 2021-08-24 NOTE — ED ADULT NURSE NOTE - OBJECTIVE STATEMENT
Pt is an 79 y/o M, PMHx Parkinson's disease (baseline confusion A&O x 1-2, ambulates w/ cane, on Carbidopa-Levodopa), HTN on Lisinopril 5 mg, Depression /anxiety on Lorazepam unknown dose, Desvenlafaxine unknown dose, recent frequent falls in the past 2 months (no prior head imaging performed), and started on Seroquel 25 mg because he was having visual hallucinations. per spouse who accompanied patient, last fall 3 weeks ago. Today around 0400, wife states pt has been excessively stuporous and nonresponsive. pt difficult to arouse, responsive to pain. airway intact and non-obstructed.

## 2021-08-24 NOTE — ED PROVIDER NOTE - PHYSICAL EXAMINATION
Constitutional: Elderly, frail, sleeping, arousable to painful stimuli, stuporous  Head atraumatic, normocephalic  ENMT: Airway patent. Mildly dry MM. + gag reflex intact  Eyes: Clear bilaterally, PERRL, EOMI  Cardiac: Normal rate, regular rhythm.  Heart sounds S1, S2.  No murmurs, rubs or gallops.  Respiratory: Breaths sounds equal and clear b/l. No increased WOB, tachypnea, hypoxia, or accessory mm use. Pt speaks in full sentences.   Gastrointestinal: Abd soft, NT, ND, NABS. No guarding, rebound, or rigidity. No pulsatile abdominal masses. no signs of trauma  Musculoskeletal: Range of motion is not limited. no signs of trauma. no midline spinal tttp  Neuro: does not verbally respond. follows minimal commands. no facial droop. drift x 4 ext most sig in LUE and b/l LE. withdraws from pain x 4 ext  Skin: Skin normal color for race, warm, dry and intact. No evidence of rash.  Psych: unable to assess

## 2021-08-24 NOTE — ED PROVIDER NOTE - CLINICAL SUMMARY MEDICAL DECISION MAKING FREE TEXT BOX
Pt w/ PMHx PD and baseline poor mental status, on multiple sedating meds, p/w progressive change in MS over several hours yesterday into today w/ sig lethargy, now stuporous, but w/ gag reflex, normal RR, maintaining sats, and protecting airway. Stroke code initiated due to more profound L sided drift w/ change in MS. Other considerations include traumatic ICH, toxic / metabolic disturbance, infection, less likely other pathology. Afebrile rectally. F/u CT stroke imaging, stroke recommendations, results. Anticipate admission. Will trend VBG for CO2 levels and closely observe.

## 2021-08-24 NOTE — H&P ADULT - ASSESSMENT
**INCOMPLETE**  **INCOMPLETE UNTIL REVIEWED WITH ICU ATTENDING**      LESLIE DAVIS is a 80y Male with a past medical history of     NEUROLOGY      CARDIOVASCULAR      RESPIRATORY       RENAL       ENDOCRINE      GASTROINTESTINAL      INFECTIOUS DISEASE      HEMATOLOGY/ONCOLOGY      MSK      METABOLIC      ACCESS/LINES/DRAINS      ETHICS/GOALS OF CARE      PROPHYLACTIC  FLUIDS: none at this time  ELECTROLYTES: Mg<2, K<4  DIET: regular  GI PPX: protonix  VTE PPX: SCD, lovenox  CODE: FULL  DISPO: 7L Tele 80y Male with PMHx of Parkinson's disease, multiple falls, HTN, depression, and anxiety presents to the ED with altered mental status likely i/s/o medication interaction/overuse vs. non-convulsive seizures, as well as with elevated transaminases/lipase/amylase with concern for gallstone pancreatitis.     NEURO:  #AMS  Pt has been stupor all day per wife, with last seen normal at 9pm 8/23. Given home meds strong suspicion for medication interaction or over use as cause. Given negative lactate low s/f for convulsive seizure but c/f non-convulsive leading to post-ictal state. Lower s/f for intraabdominal pathology causing encephalopathy given lack of SIRS.   - EEG  - TSH, B12, folate, RPR  - Thiamine, wernicke's dosing given remote hx of etoh  - Hold all home meds     CV:  #Sinus luis eduardo w/ 1 AVB  No QTc prolongation     #HTN  BP initially in 200s, now in 120s. Lisinopril 5mg QD at home.   - Acceptable BP in 160s     PULM:  #Aspiration precautions     GI:  #Elevated bili and transaminases  Abd U/S showing normal CBD size and no e/o cholecystitis. No sepsis present so low c/f cholangitis. Lipase 1065. Amylase 625.   - Possible gallstone pancreatitis, with gallstone already passed.   - Trend LFTs  - NPO  - IV hydration w/ LR @ 50 cc/hr  - GI consult in the AM      F: LR   E: Replete electrolytes as needed, K>4, M>2  N: NPO  DVT ppx: SCD, Lovenox  Dispo: 7L telemetry

## 2021-08-24 NOTE — CONSULT NOTE ADULT - SUBJECTIVE AND OBJECTIVE BOX
Patient is a 80y old  Male who presents with a chief complaint of     Consult reason: AMS  ED vitals: T Afeb   HR 50-60s   RR 18  /94-->164/82  SpO2 98  Labs signficant: No WBC, no lactate, no electrolyte abnormalities. Elevated bilirubin to 3.3, AST//377  Imaging/EKG: Sinus luis eduardo with 1 AVB, no ischemic changes   Interventions: 1L NS     HPI: 80y Male with PMHx of Parkinson's disease, multiple falls, HTN, depression, and anxiety presents to the ED with altered mental status. Collateral obtained by ED from pt's wife who was no longer present upon ICU consult arrival.       Allergies    No Known Allergies    Intolerances      Home Medications:  aspirin 81 mg oral tablet: orally once a day (24 Aug 2021 19:44)  carbidopa-levodopa 10 mg-100 mg oral tablet: 1 tab(s) orally 4 times a day (24 Aug 2021 19:44)  desvenlafaxine (as base) 50 mg oral tablet, extended release:  (24 Aug 2021 19:45)  lisinopril 5 mg oral tablet: 1 tab(s) orally once a day (24 Aug 2021 19:44)  LORazepam 0.5 mg oral tablet:  (24 Aug 2021 19:44)      SOCIAL HX:     Smoking          ETOH/Illicit drugs          Occupation    PAST MEDICAL & SURGICAL HISTORY:      FAMILY HISTORY:  :    No known cardiovascular or pulmonary family history     ROS:  See HPI     PHYSICAL EXAM    ICU Vital Signs Last 24 Hrs  T(C): 36.8 (24 Aug 2021 19:32), Max: 36.8 (24 Aug 2021 19:32)  T(F): 98.3 (24 Aug 2021 19:32), Max: 98.3 (24 Aug 2021 19:32)  HR: 59 (24 Aug 2021 20:51) (57 - 67)  BP: 164/82 (24 Aug 2021 20:51) (153/84 - 218/94)  BP(mean): --  ABP: --  ABP(mean): --  RR: 19 (24 Aug 2021 20:51) (16 - 20)  SpO2: 99% (24 Aug 2021 20:51) (95% - 99%)      General: Not in distress  HEENT:  JUSTEN              Lymphatic system: No LN  Lungs: Bilateral BS  Cardiovascular: Regular  Gastrointestinal: Soft, Positive BS  Musculoskeletal: No clubbing.  Moves all extremities.    Skin: Warm.  Intact  Neurological: No motor or sensory deficit         LABS:                          13.4   6.35  )-----------( 163      ( 24 Aug 2021 17:53 )             41.6                                                   138  |  106  |  20  ----------------------------<  104<H>  4.4   |  28  |  1.09    Ca    9.5      24 Aug 2021 17:53    TPro  6.8  /  Alb  3.7  /  TBili  3.3<H>  /  DBili  x   /  AST  496<H>  /  ALT  377<H>  /  AlkPhos  272<H>        PT/INR - ( 24 Aug 2021 17:53 )   PT: 11.6 sec;   INR: 0.97          PTT - ( 24 Aug 2021 17:53 )  PTT:28.0 sec                                       Urinalysis Basic - ( 24 Aug 2021 18:43 )    Color: Yellow / Appearance: Clear / S.015 / pH: x  Gluc: x / Ketone: NEGATIVE  / Bili: Small / Urobili: 0.2 E.U./dL   Blood: x / Protein: NEGATIVE mg/dL / Nitrite: POSITIVE   Leuk Esterase: NEGATIVE / RBC: < 5 /HPF / WBC < 5 /HPF   Sq Epi: x / Non Sq Epi: 0-5 /HPF / Bacteria: Present /HPF        CARDIAC MARKERS ( 24 Aug 2021 17:53 )  x     / 0.01 ng/mL / x     / x     / x                                                LIVER FUNCTIONS - ( 24 Aug 2021 17:53 )  Alb: 3.7 g/dL / Pro: 6.8 g/dL / ALK PHOS: 272 U/L / ALT: 377 U/L / AST: 496 U/L / GGT: x                                                                                                                                           CXR:    ECHO:    MEDICATIONS  (STANDING):    MEDICATIONS  (PRN):         Patient is a 80y old  Male who presents with a chief complaint of     Consult reason: AMS  ED vitals: T Afeb   HR 50-60s   RR 18  /94-->164/82  SpO2 98  Labs signficant: No WBC, no lactate, no electrolyte abnormalities. Elevated bilirubin to 3.3, AST//377  Imaging/EKG: Sinus luis eduardo with 1 AVB, no ischemic changes   Interventions: 1L NS     HPI: 80y Male with PMHx of Parkinson's disease, multiple falls, HTN, depression, and anxiety presents to the ED with altered mental status. Collateral obtained by ED from pt's wife who was no longer present upon ICU consult arrival. Per patient's wife, patient was in his usual state of health last night when he became more confused at 2100 asking where family members were that no longer lived with them. The patient's wife also states that pt usually manages his own oral medications, but last night he expressed that he might need help moving forward. The patient asked his wife sometime between 0200 and 0400 for pepto bismal which he may have taken, but it was unwitnessed. After this encounter, the patient went back to sleep, and he did not wake up for the rest of the day despite his wife attempting to wake him. No reported melena, hematochezia, N/V, hematemesis.       Allergies    No Known Allergies    Intolerances      Home Medications:  aspirin 81 mg oral tablet: orally once a day (24 Aug 2021 19:44)  carbidopa-levodopa 10 mg-100 mg oral tablet: 1 tab(s) orally 4 times a day (24 Aug 2021 19:44)  desvenlafaxine (as base) 50 mg oral tablet, extended release:  (24 Aug 2021 19:45)  lisinopril 5 mg oral tablet: 1 tab(s) orally once a day (24 Aug 2021 19:44)  LORazepam 0.5 mg oral tablet:  (24 Aug 2021 19:44)  Seroquel 25mg QHS       SOCIAL HX:     Smoking          ETOH/Illicit drugs          Occupation    PAST MEDICAL & SURGICAL HISTORY:      FAMILY HISTORY:  :    No known cardiovascular or pulmonary family history     ROS:  See HPI     PHYSICAL EXAM    ICU Vital Signs Last 24 Hrs  T(C): 36.8 (24 Aug 2021 19:32), Max: 36.8 (24 Aug 2021 19:32)  T(F): 98.3 (24 Aug 2021 19:32), Max: 98.3 (24 Aug 2021 19:32)  HR: 59 (24 Aug 2021 20:51) (57 - 67)  BP: 164/82 (24 Aug 2021 20:51) (153/84 - 218/94)  BP(mean): --  ABP: --  ABP(mean): --  RR: 19 (24 Aug 2021 20:51) (16 - 20)  SpO2: 99% (24 Aug 2021 20:51) (95% - 99%)      General: Not in distress  HEENT:  JUSTEN              Lymphatic system: No LN  Lungs: Bilateral BS  Cardiovascular: Regular  Gastrointestinal: Soft, Positive BS  Musculoskeletal: No clubbing.  Moves all extremities.    Skin: Warm.  Intact  Neurological: No motor or sensory deficit         LABS:                          13.4   6.35  )-----------( 163      ( 24 Aug 2021 17:53 )             41.6                                                   138  |  106  |  20  ----------------------------<  104<H>  4.4   |  28  |  1.09    Ca    9.5      24 Aug 2021 17:53    TPro  6.8  /  Alb  3.7  /  TBili  3.3<H>  /  DBili  x   /  AST  496<H>  /  ALT  377<H>  /  AlkPhos  272<H>        PT/INR - ( 24 Aug 2021 17:53 )   PT: 11.6 sec;   INR: 0.97          PTT - ( 24 Aug 2021 17:53 )  PTT:28.0 sec                                       Urinalysis Basic - ( 24 Aug 2021 18:43 )    Color: Yellow / Appearance: Clear / S.015 / pH: x  Gluc: x / Ketone: NEGATIVE  / Bili: Small / Urobili: 0.2 E.U./dL   Blood: x / Protein: NEGATIVE mg/dL / Nitrite: POSITIVE   Leuk Esterase: NEGATIVE / RBC: < 5 /HPF / WBC < 5 /HPF   Sq Epi: x / Non Sq Epi: 0-5 /HPF / Bacteria: Present /HPF        CARDIAC MARKERS ( 24 Aug 2021 17:53 )  x     / 0.01 ng/mL / x     / x     / x                                                LIVER FUNCTIONS - ( 24 Aug 2021 17:53 )  Alb: 3.7 g/dL / Pro: 6.8 g/dL / ALK PHOS: 272 U/L / ALT: 377 U/L / AST: 496 U/L / GGT: x                                                                                                                                           CXR:    ECHO:    MEDICATIONS  (STANDING):    MEDICATIONS  (PRN):         Patient is a 80y old  Male who presents with a chief complaint of     Consult reason: AMS  ED vitals: T Afeb   HR 50-60s   RR 18  /94-->164/82  SpO2 98  Labs signficant: No WBC, no lactate, no electrolyte abnormalities. Elevated bilirubin to 3.3, AST//377  Imaging/EKG: Sinus luis eduardo with 1 AVB, no ischemic changes   Interventions: 1L NS     HPI: 80y Male with PMHx of Parkinson's disease, multiple falls, HTN, depression, and anxiety presents to the ED with altered mental status. Collateral obtained by ED from pt's wife who was no longer present upon ICU consult arrival. Per patient's wife, patient was in his usual state of health last night when he became more confused at 2100 asking where family members were that no longer lived with them. The patient's wife also states that pt usually manages his own oral medications, but last night he expressed that he might need help moving forward. The patient asked his wife sometime between 0200 and 0400 for pepto bismal which he may have taken, but it was unwitnessed. After this encounter, the patient went back to sleep, and he did not wake up for the rest of the day despite his wife attempting to wake him. There have been reported increased falls over the past few months and was recently started on 25mg seroquel at home. No reported melena, hematochezia, N/V, hematemesis.       Allergies    No Known Allergies    Intolerances      Home Medications:  aspirin 81 mg oral tablet: orally once a day (24 Aug 2021 19:44)  carbidopa-levodopa 10 mg-100 mg oral tablet: 1 tab(s) orally 4 times a day (24 Aug 2021 19:44)  desvenlafaxine (as base) 50 mg oral tablet, extended release:  (24 Aug 2021 19:45)  lisinopril 5 mg oral tablet: 1 tab(s) orally once a day (24 Aug 2021 19:44)  LORazepam 0.5 mg oral tablet:  (24 Aug 2021 19:44)  Seroquel 25mg QHS       SOCIAL HX:     Smoking          ETOH/Illicit drugs          Occupation    PAST MEDICAL & SURGICAL HISTORY:      FAMILY HISTORY:  :    No known cardiovascular or pulmonary family history     ROS:  See HPI     PHYSICAL EXAM    ICU Vital Signs Last 24 Hrs  T(C): 36.8 (24 Aug 2021 19:32), Max: 36.8 (24 Aug 2021 19:32)  T(F): 98.3 (24 Aug 2021 19:32), Max: 98.3 (24 Aug 2021 19:32)  HR: 59 (24 Aug 2021 20:51) (57 - 67)  BP: 164/82 (24 Aug 2021 20:51) (153/84 - 218/94)  BP(mean): --  ABP: --  ABP(mean): --  RR: 19 (24 Aug 2021 20:51) (16 - 20)  SpO2: 99% (24 Aug 2021 20:51) (95% - 99%)      General: Stuporous   HEENT:  JUSTEN, + gag reflex, negative kernig's sign               Lungs: Bilateral BS  Cardiovascular: sinus rate, regular rhythm, no m/r/g  Gastrointestinal: Soft, Positive BS. TTP RUQ   Musculoskeletal:  Moves all extremities to noxious stimuli    Skin: Warm.  Intact  Neurological: No facial asymmetry         LABS:                          13.4   6.35  )-----------( 163      ( 24 Aug 2021 17:53 )             41.6                                               08    138  |  106  |  20  ----------------------------<  104<H>  4.4   |  28  |  1.09    Ca    9.5      24 Aug 2021 17:53    TPro  6.8  /  Alb  3.7  /  TBili  3.3<H>  /  DBili  x   /  AST  496<H>  /  ALT  377<H>  /  AlkPhos  272<H>        PT/INR - ( 24 Aug 2021 17:53 )   PT: 11.6 sec;   INR: 0.97          PTT - ( 24 Aug 2021 17:53 )  PTT:28.0 sec                                       Urinalysis Basic - ( 24 Aug 2021 18:43 )    Color: Yellow / Appearance: Clear / S.015 / pH: x  Gluc: x / Ketone: NEGATIVE  / Bili: Small / Urobili: 0.2 E.U./dL   Blood: x / Protein: NEGATIVE mg/dL / Nitrite: POSITIVE   Leuk Esterase: NEGATIVE / RBC: < 5 /HPF / WBC < 5 /HPF   Sq Epi: x / Non Sq Epi: 0-5 /HPF / Bacteria: Present /HPF        CARDIAC MARKERS ( 24 Aug 2021 17:53 )  x     / 0.01 ng/mL / x     / x     / x                                                LIVER FUNCTIONS - ( 24 Aug 2021 17:53 )  Alb: 3.7 g/dL / Pro: 6.8 g/dL / ALK PHOS: 272 U/L / ALT: 377 U/L / AST: 496 U/L / GGT: x                                                                                                                                           CXR:    ECHO:    MEDICATIONS  (STANDING):    MEDICATIONS  (PRN):

## 2021-08-25 DIAGNOSIS — I10 ESSENTIAL (PRIMARY) HYPERTENSION: ICD-10-CM

## 2021-08-25 DIAGNOSIS — F32.9 MAJOR DEPRESSIVE DISORDER, SINGLE EPISODE, UNSPECIFIED: ICD-10-CM

## 2021-08-25 DIAGNOSIS — G20 PARKINSON'S DISEASE: ICD-10-CM

## 2021-08-25 DIAGNOSIS — R41.82 ALTERED MENTAL STATUS, UNSPECIFIED: ICD-10-CM

## 2021-08-25 DIAGNOSIS — R09.89 OTHER SPECIFIED SYMPTOMS AND SIGNS INVOLVING THE CIRCULATORY AND RESPIRATORY SYSTEMS: ICD-10-CM

## 2021-08-25 DIAGNOSIS — K80.20 CALCULUS OF GALLBLADDER WITHOUT CHOLECYSTITIS WITHOUT OBSTRUCTION: ICD-10-CM

## 2021-08-25 LAB
A1C WITH ESTIMATED AVERAGE GLUCOSE RESULT: 5 % — SIGNIFICANT CHANGE UP (ref 4–5.6)
ALBUMIN SERPL ELPH-MCNC: 3.4 G/DL — SIGNIFICANT CHANGE UP (ref 3.3–5)
ALP SERPL-CCNC: 289 U/L — HIGH (ref 40–120)
ALT FLD-CCNC: 342 U/L — HIGH (ref 10–45)
ANION GAP SERPL CALC-SCNC: 8 MMOL/L — SIGNIFICANT CHANGE UP (ref 5–17)
AST SERPL-CCNC: 276 U/L — HIGH (ref 10–40)
BASOPHILS # BLD AUTO: 0.06 K/UL — SIGNIFICANT CHANGE UP (ref 0–0.2)
BASOPHILS NFR BLD AUTO: 1.2 % — SIGNIFICANT CHANGE UP (ref 0–2)
BILIRUB DIRECT SERPL-MCNC: 1.1 MG/DL — HIGH (ref 0–0.2)
BILIRUB INDIRECT FLD-MCNC: 1.3 MG/DL — HIGH (ref 0.2–1)
BILIRUB SERPL-MCNC: 2.4 MG/DL — HIGH (ref 0.2–1.2)
BILIRUB SERPL-MCNC: 2.4 MG/DL — HIGH (ref 0.2–1.2)
BUN SERPL-MCNC: 20 MG/DL — SIGNIFICANT CHANGE UP (ref 7–23)
CALCIUM SERPL-MCNC: 8.9 MG/DL — SIGNIFICANT CHANGE UP (ref 8.4–10.5)
CHLORIDE SERPL-SCNC: 109 MMOL/L — HIGH (ref 96–108)
CHOLEST SERPL-MCNC: 170 MG/DL — SIGNIFICANT CHANGE UP
CO2 SERPL-SCNC: 24 MMOL/L — SIGNIFICANT CHANGE UP (ref 22–31)
COVID-19 SPIKE DOMAIN AB INTERP: POSITIVE
COVID-19 SPIKE DOMAIN ANTIBODY RESULT: >250 U/ML — HIGH
CREAT SERPL-MCNC: 0.96 MG/DL — SIGNIFICANT CHANGE UP (ref 0.5–1.3)
CULTURE RESULTS: SIGNIFICANT CHANGE UP
EOSINOPHIL # BLD AUTO: 0.31 K/UL — SIGNIFICANT CHANGE UP (ref 0–0.5)
EOSINOPHIL NFR BLD AUTO: 6 % — SIGNIFICANT CHANGE UP (ref 0–6)
ESTIMATED AVERAGE GLUCOSE: 97 MG/DL — SIGNIFICANT CHANGE UP (ref 68–114)
FOLATE SERPL-MCNC: >20 NG/ML — SIGNIFICANT CHANGE UP
GAS PNL BLDA: SIGNIFICANT CHANGE UP
GLUCOSE SERPL-MCNC: 88 MG/DL — SIGNIFICANT CHANGE UP (ref 70–99)
HCT VFR BLD CALC: 38.2 % — LOW (ref 39–50)
HDLC SERPL-MCNC: 58 MG/DL — SIGNIFICANT CHANGE UP
HGB BLD-MCNC: 12.1 G/DL — LOW (ref 13–17)
IMM GRANULOCYTES NFR BLD AUTO: 0.4 % — SIGNIFICANT CHANGE UP (ref 0–1.5)
LIDOCAIN IGE QN: 183 U/L — HIGH (ref 7–60)
LIPID PNL WITH DIRECT LDL SERPL: 95 MG/DL — SIGNIFICANT CHANGE UP
LYMPHOCYTES # BLD AUTO: 0.8 K/UL — LOW (ref 1–3.3)
LYMPHOCYTES # BLD AUTO: 15.4 % — SIGNIFICANT CHANGE UP (ref 13–44)
MAGNESIUM SERPL-MCNC: 2.2 MG/DL — SIGNIFICANT CHANGE UP (ref 1.6–2.6)
MCHC RBC-ENTMCNC: 30.3 PG — SIGNIFICANT CHANGE UP (ref 27–34)
MCHC RBC-ENTMCNC: 31.7 GM/DL — LOW (ref 32–36)
MCV RBC AUTO: 95.7 FL — SIGNIFICANT CHANGE UP (ref 80–100)
MONOCYTES # BLD AUTO: 0.44 K/UL — SIGNIFICANT CHANGE UP (ref 0–0.9)
MONOCYTES NFR BLD AUTO: 8.5 % — SIGNIFICANT CHANGE UP (ref 2–14)
NEUTROPHILS # BLD AUTO: 3.55 K/UL — SIGNIFICANT CHANGE UP (ref 1.8–7.4)
NEUTROPHILS NFR BLD AUTO: 68.5 % — SIGNIFICANT CHANGE UP (ref 43–77)
NON HDL CHOLESTEROL: 112 MG/DL — SIGNIFICANT CHANGE UP
NRBC # BLD: 0 /100 WBCS — SIGNIFICANT CHANGE UP (ref 0–0)
PHOSPHATE SERPL-MCNC: 3.6 MG/DL — SIGNIFICANT CHANGE UP (ref 2.5–4.5)
PLATELET # BLD AUTO: 159 K/UL — SIGNIFICANT CHANGE UP (ref 150–400)
POTASSIUM SERPL-MCNC: 3.8 MMOL/L — SIGNIFICANT CHANGE UP (ref 3.5–5.3)
POTASSIUM SERPL-SCNC: 3.8 MMOL/L — SIGNIFICANT CHANGE UP (ref 3.5–5.3)
PROT SERPL-MCNC: 6.2 G/DL — SIGNIFICANT CHANGE UP (ref 6–8.3)
RBC # BLD: 3.99 M/UL — LOW (ref 4.2–5.8)
RBC # FLD: 13.2 % — SIGNIFICANT CHANGE UP (ref 10.3–14.5)
SARS-COV-2 IGG+IGM SERPL QL IA: >250 U/ML — HIGH
SARS-COV-2 IGG+IGM SERPL QL IA: POSITIVE
SODIUM SERPL-SCNC: 141 MMOL/L — SIGNIFICANT CHANGE UP (ref 135–145)
SPECIMEN SOURCE: SIGNIFICANT CHANGE UP
T4 AB SER-ACNC: 5.19 UG/DL — SIGNIFICANT CHANGE UP (ref 4.5–11.7)
TRIGL SERPL-MCNC: 85 MG/DL — SIGNIFICANT CHANGE UP
TSH SERPL-MCNC: 0.31 UIU/ML — SIGNIFICANT CHANGE UP (ref 0.27–4.2)
VIT B12 SERPL-MCNC: 1822 PG/ML — HIGH (ref 232–1245)
WBC # BLD: 5.18 K/UL — SIGNIFICANT CHANGE UP (ref 3.8–10.5)
WBC # FLD AUTO: 5.18 K/UL — SIGNIFICANT CHANGE UP (ref 3.8–10.5)

## 2021-08-25 PROCEDURE — 99223 1ST HOSP IP/OBS HIGH 75: CPT | Mod: GC

## 2021-08-25 PROCEDURE — 99223 1ST HOSP IP/OBS HIGH 75: CPT

## 2021-08-25 PROCEDURE — 99233 SBSQ HOSP IP/OBS HIGH 50: CPT | Mod: GC

## 2021-08-25 RX ORDER — ASPIRIN/CALCIUM CARB/MAGNESIUM 324 MG
81 TABLET ORAL DAILY
Refills: 0 | Status: DISCONTINUED | OUTPATIENT
Start: 2021-08-25 | End: 2021-08-27

## 2021-08-25 RX ORDER — THIAMINE MONONITRATE (VIT B1) 100 MG
500 TABLET ORAL EVERY 8 HOURS
Refills: 0 | Status: DISCONTINUED | OUTPATIENT
Start: 2021-08-25 | End: 2021-08-26

## 2021-08-25 RX ORDER — DESVENLAFAXINE 50 MG/1
0 TABLET, EXTENDED RELEASE ORAL
Qty: 0 | Refills: 0 | DISCHARGE

## 2021-08-25 RX ORDER — ATORVASTATIN CALCIUM 80 MG/1
80 TABLET, FILM COATED ORAL AT BEDTIME
Refills: 0 | Status: DISCONTINUED | OUTPATIENT
Start: 2021-08-25 | End: 2021-08-27

## 2021-08-25 RX ORDER — LISINOPRIL 2.5 MG/1
1 TABLET ORAL
Qty: 0 | Refills: 0 | DISCHARGE

## 2021-08-25 RX ORDER — QUETIAPINE FUMARATE 200 MG/1
6.25 TABLET, FILM COATED ORAL EVERY 24 HOURS
Refills: 0 | Status: DISCONTINUED | OUTPATIENT
Start: 2021-08-25 | End: 2021-08-26

## 2021-08-25 RX ORDER — CLOPIDOGREL BISULFATE 75 MG/1
75 TABLET, FILM COATED ORAL DAILY
Refills: 0 | Status: DISCONTINUED | OUTPATIENT
Start: 2021-08-25 | End: 2021-08-27

## 2021-08-25 RX ORDER — ASPIRIN/CALCIUM CARB/MAGNESIUM 324 MG
0 TABLET ORAL
Qty: 0 | Refills: 0 | DISCHARGE

## 2021-08-25 RX ORDER — POTASSIUM CHLORIDE 20 MEQ
20 PACKET (EA) ORAL ONCE
Refills: 0 | Status: COMPLETED | OUTPATIENT
Start: 2021-08-25 | End: 2021-08-25

## 2021-08-25 RX ORDER — CARBIDOPA AND LEVODOPA 25; 100 MG/1; MG/1
1 TABLET ORAL EVERY 6 HOURS
Refills: 0 | Status: DISCONTINUED | OUTPATIENT
Start: 2021-08-25 | End: 2021-08-25

## 2021-08-25 RX ORDER — CARBIDOPA AND LEVODOPA 25; 100 MG/1; MG/1
1 TABLET ORAL EVERY 6 HOURS
Refills: 0 | Status: DISCONTINUED | OUTPATIENT
Start: 2021-08-25 | End: 2021-08-27

## 2021-08-25 RX ORDER — CARBIDOPA AND LEVODOPA 25; 100 MG/1; MG/1
1 TABLET ORAL
Qty: 0 | Refills: 0 | DISCHARGE

## 2021-08-25 RX ADMIN — Medication 105 MILLIGRAM(S): at 17:18

## 2021-08-25 RX ADMIN — Medication 81 MILLIGRAM(S): at 14:47

## 2021-08-25 RX ADMIN — CLOPIDOGREL BISULFATE 75 MILLIGRAM(S): 75 TABLET, FILM COATED ORAL at 14:47

## 2021-08-25 RX ADMIN — Medication 105 MILLIGRAM(S): at 10:00

## 2021-08-25 RX ADMIN — ATORVASTATIN CALCIUM 80 MILLIGRAM(S): 80 TABLET, FILM COATED ORAL at 21:15

## 2021-08-25 RX ADMIN — Medication 20 MILLIEQUIVALENT(S): at 17:18

## 2021-08-25 RX ADMIN — CARBIDOPA AND LEVODOPA 1 TABLET(S): 25; 100 TABLET ORAL at 21:15

## 2021-08-25 RX ADMIN — ENOXAPARIN SODIUM 40 MILLIGRAM(S): 100 INJECTION SUBCUTANEOUS at 09:52

## 2021-08-25 RX ADMIN — CARBIDOPA AND LEVODOPA 1 TABLET(S): 25; 100 TABLET ORAL at 14:48

## 2021-08-25 RX ADMIN — Medication 105 MILLIGRAM(S): at 01:44

## 2021-08-25 RX ADMIN — QUETIAPINE FUMARATE 6.25 MILLIGRAM(S): 200 TABLET, FILM COATED ORAL at 21:17

## 2021-08-25 NOTE — PROGRESS NOTE ADULT - ATTENDING COMMENTS
Pt awakening and speech and swallow eval noted. Neuro f/u and MRI ordered and EEG. Continue monitoring. Pt awakening and speech and swallow eval noted. Neuro f/u and MRI ordered and EEG. Continue monitoring. Obtain home lorazepam bottle to assess how many pills taken.

## 2021-08-25 NOTE — SWALLOW BEDSIDE ASSESSMENT ADULT - SWALLOW EVAL: DIAGNOSIS
Suspect at least a moderate pharyngeal dysphagia as evidenced by +throat clear, cough & wet voice following trials of thin & NTL, suggestive of aspiration.

## 2021-08-25 NOTE — BH CONSULTATION LIAISON ASSESSMENT NOTE - CURRENT MEDICATION
MEDICATIONS  (STANDING):  aspirin enteric coated 81 milliGRAM(s) Oral daily  atorvastatin 80 milliGRAM(s) Oral at bedtime  carbidopa/levodopa  10/100 1 Tablet(s) Oral every 6 hours  clopidogrel Tablet 75 milliGRAM(s) Oral daily  enoxaparin Injectable 40 milliGRAM(s) SubCutaneous every 24 hours  lactated ringers. 1000 milliLiter(s) (50 mL/Hr) IV Continuous <Continuous>  potassium chloride   Powder 20 milliEquivalent(s) Oral once  thiamine IVPB 500 milliGRAM(s) IV Intermittent every 8 hours    MEDICATIONS  (PRN):

## 2021-08-25 NOTE — BH CONSULTATION LIAISON ASSESSMENT NOTE - OTHER
staring attentive, grossly normal concentration, occasional distractible mildly increased tone in bilateral UE impoverished very mild resting tremor stable posture, gait not assessed

## 2021-08-25 NOTE — BH CONSULTATION LIAISON ASSESSMENT NOTE - NSBHCHARTREVIEWVS_PSY_A_CORE FT
Vital Signs Last 24 Hrs  T(C): 36.7 (25 Aug 2021 14:04), Max: 36.8 (24 Aug 2021 19:32)  T(F): 98.1 (25 Aug 2021 14:04), Max: 98.3 (24 Aug 2021 19:32)  HR: 63 (25 Aug 2021 13:03) (57 - 67)  BP: 123/69 (25 Aug 2021 13:03) (118/57 - 218/94)  BP(mean): 92 (25 Aug 2021 13:03) (81 - 105)  RR: 18 (25 Aug 2021 13:03) (16 - 22)  SpO2: 97% (25 Aug 2021 13:03) (95% - 99%)

## 2021-08-25 NOTE — SWALLOW BEDSIDE ASSESSMENT ADULT - SLP GENERAL OBSERVATIONS
[facility]  [facility] [address] [facility]  [address]  Dept: [phone]      Today's Date: 9/14/2018  Patient Name: Maria Dolores Smith  Patient's age: 50 y.o., 1971        CHIEF COMPLAINT:    Chief Complaint   Patient presents with    Hypertension     Pt last seen on 8/24/18 by Dr. Gan Sow Maintenance     Due for HIV and Flu, discuss with pt. History Obtained From:  patient    HISTORY OF PRESENT ILLNESS:      The patient is a 50 y.o. old  male and is here to Follow-up for COPD and hypertension. His blood pressure is well controlled with current medication. He is on metoprolol and amlodipine. His COPD is improved with current inhalers. He is on Combivent and Dulera. We'll change Combivent to Spiriva and continue albuterol inhaler as needed. We'll also continue Dulera. He has a nonhealing wound in right leg and is following with wound clinic. He also has peripheral vascular disease and he is encouraged to follow with vascular surgery. 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 the region      Patient Active Problem List   Diagnosis    Essential hypertension    PVD [facility]peripheral vascular disease) ([facility] [address]    Stage 3 chronic kidney disease    Closed nondisplaced fracture of third metatarsal bone of left foot    Ischemia of toe    Critical lower limb ischemia    Gangrene of toe of left foot ([address]    Bandemia    Thrombocytosis (HCC)    Moderate malnutrition (HCC)    Pain of lower extremity    Smoking       Past Medical History:   has a past medical history of CAD (coronary artery disease); COPD (chronic obstructive pulmonary disease) ([address] Hypertension; MRSA (methicillin resistant staph aureus) culture positive; and Peripheral vascular disease ([address] Past Surgical History:   has a past surgical history that includes Coronary angioplasty with stent. Last Dose    lidocaine (LMX) 4 % cream   Topical PRN Georgina Gaffney DPM             Allergies:  Patient has no known allergies. Social History:   reports that he quit smoking about 4 months ago. His smoking use included Cigars. He has a 35.00 pack-year smoking history. He has never used smokeless tobacco. He reports that he uses drugs, including Marijuana. He reports that he does not drink alcohol. Family History: family history includes Diabetes in his mother. REVIEW OF SYSTEMS:      Constitutional: Negative for fever and fatigue. HENT: Negative for congestion and sore throat. Eyes: Negative for eye pain and visual disturbance. Respiratory: Negative for chest tightness and shortness of breath. Cardiovascular: Negative for chest pain and orthopnea . Gastrointestinal: Negative for vomiting, abdominal pain, constipation and diarrhea. Endocrine: Negative for cold intolerance, heat intolerance, polydipsia and polyuria. Genitourinary: Negative for dysuria and frequency. Musculoskeletal: Negative for  Myalgia, back pain, bone pain and arthralgias. Neurological: Negative for dizziness, weakness and headaches. PHYSICAL EXAM:        /81 (Site: Right Upper Arm, Position: Sitting, Cuff Size: Medium Adult)   Pulse 61   Ht 6' 1\" (1.854 m)   Wt 140 lb (63.5 kg)   BMI 18.47 kg/m²      General appearance - well appearing, not in  distress. Mental status - alert and cooperative . Head: Normocephalic, without obvious abnormality, atraumatic. Eye: PERRL, conjunctiva/corneas clear, EOM's intact. Neck - Supple, no significant adenopathy . Chest - Clear to auscultation, no wheezes, rales or rhonchi, symmetric air entry. Heart -  regular rhythm, normal S1, S2, no murmurs. Abdomen - Soft, nontender, nondistended, no masses or organomegaly. Neurological - Alert, oriented, normal speech, no focal findings or movement disorder noted.   Extremities -  No pedal edema, no clubbing or calm but confused

## 2021-08-25 NOTE — BH CONSULTATION LIAISON ASSESSMENT NOTE - RISK ASSESSMENT
Low acute risk for suicide Low acute risk for suicide  No current or known past SI, future-oriented, treatment seeking, strong family support, residential, financial stability  static risk factors including age, sex, medical illness (Parkinsons), h/o alcohol use, h/o depression  modifiable risk factors include acute illness (receiving tx), reduction in alcohol use, engagement in outpt psychiatric care

## 2021-08-25 NOTE — PROGRESS NOTE ADULT - PROBLEM SELECTOR PLAN 2
- Restarted carbidopa-levodopa  - F/u Speech and swallow eval (pt had difficulty with bedside swallow on large amounts of water)  - Neuro recs appreciated: MR brain ordered, f/u MRI brain

## 2021-08-25 NOTE — PROGRESS NOTE ADULT - SUBJECTIVE AND OBJECTIVE BOX
Neurology Stroke Progress Note    **INCOMPLETE**    INTERVAL HPI/OVERNIGHT EVENTS:  Patient seen and examined at bedside. Patient is s/p being an ER stroke code yesterday afternoon. From yesterday, interval improvement is seen in patient's mental status. In ED yesterday, patient was stuporous, eyes closed, unable to maintain attention/concentration for more than a few seconds. Today, patient is lethargic, able to keep eyes open for a few minutes before dozing off, losing concentration. Strength grossly and diffusely equal throughout. Known LLE weakness 2/2 tendon injury per wife. Etiology unclear for acute AMS.      MEDICATIONS  (STANDING):  aspirin enteric coated 81 milliGRAM(s) Oral daily  atorvastatin 80 milliGRAM(s) Oral at bedtime  clopidogrel Tablet 75 milliGRAM(s) Oral daily  enoxaparin Injectable 40 milliGRAM(s) SubCutaneous every 24 hours  lactated ringers. 1000 milliLiter(s) (50 mL/Hr) IV Continuous <Continuous>  thiamine IVPB 500 milliGRAM(s) IV Intermittent every 8 hours      Allergies    No Known Allergies    Intolerances      Vital Signs Last 24 Hrs  T(C): 36.2 (25 Aug 2021 10:30), Max: 36.8 (24 Aug 2021 19:32)  T(F): 97.2 (25 Aug 2021 10:30), Max: 98.3 (24 Aug 2021 19:32)  HR: 64 (25 Aug 2021 09:35) (57 - 67)  BP: 128/92 (25 Aug 2021 09:35) (118/57 - 218/94)  BP(mean): 105 (25 Aug 2021 09:35) (81 - 105)  RR: 20 (25 Aug 2021 09:35) (16 - 22)  SpO2: 99% (25 Aug 2021 09:35) (95% - 99%)      Physical exam:  General: No acute distress, lethargic, cooperative, able to be roused  Eyes: Anicteric sclerae, moist conjunctivae, see below for CNs  Neck: trachea midline, FROM,   Cardiovascular: Regular rate and rhythm  Pulmonary:  No use of accessory muscles  GI: Abdomen soft, non-distended, non-tender  Extremities: Radial and DP pulses +2      Neurologic:  -Mental status: Lethargic, oriented to person. Speech is slow and hypophonic. Follows commands. Fluctuating attention /concentration   -Cranial nerves:   II: Visual fields are full to confrontation.  III, IV, VI: Extraocular movements are intact without nystagmus. Pupils equally round and reactive to light  V:  Facial sensation V1-V3 equal and intact   VII: Face is grossly symmetric with eye closure and smile  VIII: Hearing is grossly intact  XII: Tongue protrudes midline  Motor: Normal bulk and tone. Strength bilateral upper extremity 5/5, RLE 5-/5, LLE 3+/5  Sensation: Intact to light touch bilaterally. No neglect or extinction on double simultaneous testing.  Coordination: No dysmetria on finger-to-nose and heel-to-shin bilaterally  Reflexes: Downgoing toes bilaterally       LABS:                        12.1   5.18  )-----------( 159      ( 25 Aug 2021 07:14 )             38.2         141  |  109<H>  |  20  ----------------------------<  88  3.8   |  24  |  0.96    Ca    8.9      25 Aug 2021 07:14  Phos  3.6       Mg     2.2         TPro  6.2  /  Alb  3.4  /  TBili  2.4<H>  /  DBili  x   /  AST  276<H>  /  ALT  342<H>  /  AlkPhos  289<H>      PT/INR - ( 24 Aug 2021 17:53 )   PT: 11.6 sec;   INR: 0.97          PTT - ( 24 Aug 2021 17:53 )  PTT:28.0 sec  Urinalysis Basic - ( 24 Aug 2021 18:43 )    Color: Yellow / Appearance: Clear / S.015 / pH: x  Gluc: x / Ketone: NEGATIVE  / Bili: Small / Urobili: 0.2 E.U./dL   Blood: x / Protein: NEGATIVE mg/dL / Nitrite: POSITIVE   Leuk Esterase: NEGATIVE / RBC: < 5 /HPF / WBC < 5 /HPF   Sq Epi: x / Non Sq Epi: 0-5 /HPF / Bacteria: Present /HPF        RADIOLOGY & ADDITIONAL TESTS:    < from: CT Brain Stroke Protocol (21 @ 19:14) >  No acute intracranial hemorrhage, mass effect or CT evidence of recent transcortical infarct.    Long-standing small vessel ischemic change and periventricular cerebral white matter, likely associated central-predominant atrophy and ventriculomegaly. Communicating hydrocephalus is not fully excluded, especially without any prior study for comparison, andfurther clinical assessment is advised. Chronic left sphenoid sinusitis.    < end of copied text >    < from: CT Perfusion w/ Maps w/ IV Cont (21 @ 18:18) >  COMPARISON: None    FINDINGS: The CBF <30% volume is reported as 0 mL.  The Tmax > 6s volume is reported as 0 mL. No mismatched thereof.    IMPRESSION: Negative CT perfusion exam of the brain.    < end of copied text >    < from: CT Angio Head w/ IV Cont (21 @ 18:15) >  1. Intracranial CTA shows abrupt lack of contrast filling in the right posterior cerebral artery at the proximal P2 segment for a span of approximately 4 mm (series 8, images 51-52). Filling defect appearance is suggestive of a recent thromboembolic event.    Furthermore, the left posterior cerebral artery P2 segment shows a focal site of moderate there are severe stenosis, likely atherosclerotic in nature.    Also correction to below: There is NO hemodynamically significant stenosis of the internal carotid arteries or its or circulation. Also, the left vertebral artery intracranially and the left PICA fill with contrast, presumably in retrograde supply.    2. Extracranial CTA: Origin of the left vertebral artery is not well seen, especially in the presence of adjacent and more dense incoming venous and craniad-refluxing contrast. However, there is patency seen of the left vertebral artery of the more distal V1 segment, and throughout its V2 and V3 segments, but very faint and discontinuous comparison the more robustly patent right vertebral artery. Greater contrast density is seen distally, suggesting a retrograde source of flow, especially to the left V4 segment and PICA as mentioned above.    < end of copied text >       Neurology Stroke Progress Note      INTERVAL HPI/OVERNIGHT EVENTS:  Patient seen and examined at bedside. Patient is s/p being an ER stroke code yesterday afternoon for acute AMS w/stupor. History of Parkinson's dz for over 10 years per wife's report. From yesterday, interval improvement is seen in patient's mental status. In ED yesterday, patient was stuporous, eyes closed, unable to maintain attention/concentration for more than a few seconds. Today, patient is lethargic, able to state name, age, and month; able to keep eyes open for a few minutes before dozing off and losing concentration. Strength grossly and diffusely equal throughout. Known LLE weakness 2/2 tendon injury per wife. Etiology unclear for acute AMS. Pending MR head non-con to further evaluate if current episode is d/t stroke.       MEDICATIONS  (STANDING):  aspirin enteric coated 81 milliGRAM(s) Oral daily  atorvastatin 80 milliGRAM(s) Oral at bedtime  clopidogrel Tablet 75 milliGRAM(s) Oral daily  enoxaparin Injectable 40 milliGRAM(s) SubCutaneous every 24 hours  lactated ringers. 1000 milliLiter(s) (50 mL/Hr) IV Continuous <Continuous>  thiamine IVPB 500 milliGRAM(s) IV Intermittent every 8 hours      Allergies    No Known Allergies    Intolerances      Vital Signs Last 24 Hrs  T(C): 36.2 (25 Aug 2021 10:30), Max: 36.8 (24 Aug 2021 19:32)  T(F): 97.2 (25 Aug 2021 10:30), Max: 98.3 (24 Aug 2021 19:32)  HR: 64 (25 Aug 2021 09:35) (57 - 67)  BP: 128/92 (25 Aug 2021 09:35) (118/57 - 218/94)  BP(mean): 105 (25 Aug 2021 09:35) (81 - 105)  RR: 20 (25 Aug 2021 09:35) (16 - 22)  SpO2: 99% (25 Aug 2021 09:35) (95% - 99%)      Physical exam:  General: No acute distress, lethargic, cooperative, able to be roused, oriented x2  Eyes: Anicteric sclerae, moist conjunctivae, see below for CNs  Neck: trachea midline, FROM,   Cardiovascular: Regular rate and rhythm  Pulmonary:  No use of accessory muscles  GI: Abdomen soft, non-distended, non-tender  Extremities: Radial and DP pulses +2      Neurologic:  -Mental status: Lethargic, knows name, age, and month. Speech is slow and hypophonic. Follows commands. Fluctuating attention /concentration   -Cranial nerves:   II: Visual fields appear full to confrontation  III, IV, VI: Extraocular movements are intact without nystagmus. Pupils equally round and reactive to light  V:  Facial sensation V1-V3 equal and intact   VII: Face is grossly symmetric with eye closure and smile  VIII: Hearing is grossly intact  XII: Tongue protrudes midline  Motor: Normal bulk and tone. Strength bilateral upper extremity 5-/5;  RLE 4/5, LLE 3/5  Sensation: Intact to light touch bilaterally. No neglect or extinction on double simultaneous testing.  Coordination: No dysmetria w/finger to nose; unable to perform w/lower extremities   Reflexes: Downgoing toes bilaterally       LABS:                        12.1   5.18  )-----------( 159      ( 25 Aug 2021 07:14 )             38.2     08-    141  |  109<H>  |  20  ----------------------------<  88  3.8   |  24  |  0.96    Ca    8.9      25 Aug 2021 07:14  Phos  3.6       Mg     2.2         TPro  6.2  /  Alb  3.4  /  TBili  2.4<H>  /  DBili  x   /  AST  276<H>  /  ALT  342<H>  /  AlkPhos  289<H>      PT/INR - ( 24 Aug 2021 17:53 )   PT: 11.6 sec;   INR: 0.97          PTT - ( 24 Aug 2021 17:53 )  PTT:28.0 sec  Urinalysis Basic - ( 24 Aug 2021 18:43 )    Color: Yellow / Appearance: Clear / S.015 / pH: x  Gluc: x / Ketone: NEGATIVE  / Bili: Small / Urobili: 0.2 E.U./dL   Blood: x / Protein: NEGATIVE mg/dL / Nitrite: POSITIVE   Leuk Esterase: NEGATIVE / RBC: < 5 /HPF / WBC < 5 /HPF   Sq Epi: x / Non Sq Epi: 0-5 /HPF / Bacteria: Present /HPF        RADIOLOGY & ADDITIONAL TESTS:    < from: CT Brain Stroke Protocol (21 @ 19:14) >  No acute intracranial hemorrhage, mass effect or CT evidence of recent transcortical infarct.    Long-standing small vessel ischemic change and periventricular cerebral white matter, likely associated central-predominant atrophy and ventriculomegaly. Communicating hydrocephalus is not fully excluded, especially without any prior study for comparison, andfurther clinical assessment is advised. Chronic left sphenoid sinusitis.    < end of copied text >    < from: CT Perfusion w/ Maps w/ IV Cont (21 @ 18:18) >  COMPARISON: None    FINDINGS: The CBF <30% volume is reported as 0 mL.  The Tmax > 6s volume is reported as 0 mL. No mismatched thereof.    IMPRESSION: Negative CT perfusion exam of the brain.    < end of copied text >    < from: CT Angio Head w/ IV Cont (21 @ 18:15) >  1. Intracranial CTA shows abrupt lack of contrast filling in the right posterior cerebral artery at the proximal P2 segment for a span of approximately 4 mm (series 8, images 51-52). Filling defect appearance is suggestive of a recent thromboembolic event.    Furthermore, the left posterior cerebral artery P2 segment shows a focal site of moderate there are severe stenosis, likely atherosclerotic in nature.    Also correction to below: There is NO hemodynamically significant stenosis of the internal carotid arteries or its or circulation. Also, the left vertebral artery intracranially and the left PICA fill with contrast, presumably in retrograde supply.    2. Extracranial CTA: Origin of the left vertebral artery is not well seen, especially in the presence of adjacent and more dense incoming venous and craniad-refluxing contrast. However, there is patency seen of the left vertebral artery of the more distal V1 segment, and throughout its V2 and V3 segments, but very faint and discontinuous comparison the more robustly patent right vertebral artery. Greater contrast density is seen distally, suggesting a retrograde source of flow, especially to the left V4 segment and PICA as mentioned above.    < end of copied text >       Neurology Stroke Progress Note      INTERVAL HPI/OVERNIGHT EVENTS:  Patient seen and examined at bedside. Patient is s/p being an ER stroke code yesterday afternoon for acute AMS w/stupor. History of Parkinson's dz for over 10 years per wife's report. From yesterday, interval improvement is seen in patient's mental status. In ED yesterday, patient was stuporous, eyes closed, unable to maintain attention/concentration for more than a few seconds. Today, patient is lethargic, able to state name, age, and month; able to keep eyes open for a few minutes before dozing off and losing concentration. Strength grossly and diffusely equal throughout. Known LLE weakness 2/2 tendon injury per wife. Etiology unclear for acute AMS. Pending MR head non-con to further evaluate if current episode is d/t stroke.       MEDICATIONS  (STANDING):  aspirin enteric coated 81 milliGRAM(s) Oral daily  atorvastatin 80 milliGRAM(s) Oral at bedtime  clopidogrel Tablet 75 milliGRAM(s) Oral daily  enoxaparin Injectable 40 milliGRAM(s) SubCutaneous every 24 hours  lactated ringers. 1000 milliLiter(s) (50 mL/Hr) IV Continuous <Continuous>  thiamine IVPB 500 milliGRAM(s) IV Intermittent every 8 hours      Allergies    No Known Allergies    Intolerances      Vital Signs Last 24 Hrs  T(C): 36.2 (25 Aug 2021 10:30), Max: 36.8 (24 Aug 2021 19:32)  T(F): 97.2 (25 Aug 2021 10:30), Max: 98.3 (24 Aug 2021 19:32)  HR: 64 (25 Aug 2021 09:35) (57 - 67)  BP: 128/92 (25 Aug 2021 09:35) (118/57 - 218/94)  BP(mean): 105 (25 Aug 2021 09:35) (81 - 105)  RR: 20 (25 Aug 2021 09:35) (16 - 22)  SpO2: 99% (25 Aug 2021 09:35) (95% - 99%)      Physical exam:  General: No acute distress, lethargic, cooperative, able to be roused, oriented x2  Eyes: Anicteric sclerae, moist conjunctivae, see below for CNs  Neck: trachea midline, FROM,   Cardiovascular: Regular rate and rhythm  Pulmonary:  No use of accessory muscles  GI: Abdomen soft, non-distended, non-tender  Extremities: Radial and DP pulses +2      Neurologic:  No suggestion of meningismus, negative Kernig's & Brudzinski  -Mental status: Lethargic, knows name, age, and month. Speech is slow and hypophonic. Follows commands. Fluctuating attention /concentration   -Cranial nerves:   II: Visual fields appear full to confrontation  III, IV, VI: Extraocular movements are intact without nystagmus. Pupils equally round and reactive to light  V:  Facial sensation V1-V3 equal and intact   VII: Face is grossly symmetric with eye closure and smile  VIII: Hearing is grossly intact  XII: Tongue protrudes midline  Motor: Normal bulk and tone. Strength bilateral upper extremity 5-/5;  RLE 4+/5, LLE 4/5  Sensation: Intact to light touch bilaterally. No neglect or extinction on double simultaneous testing.  Coordination: No dysmetria w/finger to nose; unable to perform w/lower extremities   Reflexes: Downgoing toes bilaterally, 1+ throughout      LABS:                        12.1   5.18  )-----------( 159      ( 25 Aug 2021 07:14 )             38.2     08-25    141  |  109<H>  |  20  ----------------------------<  88  3.8   |  24  |  0.96    Ca    8.9      25 Aug 2021 07:14  Phos  3.6     08-25  Mg     2.2     08-25    TPro  6.2  /  Alb  3.4  /  TBili  2.4<H>  /  DBili  x   /  AST  276<H>  /  ALT  342<H>  /  AlkPhos  289<H>  08-25    PT/INR - ( 24 Aug 2021 17:53 )   PT: 11.6 sec;   INR: 0.97          PTT - ( 24 Aug 2021 17:53 )  PTT:28.0 sec  Urinalysis Basic - ( 24 Aug 2021 18:43 )    Color: Yellow / Appearance: Clear / S.015 / pH: x  Gluc: x / Ketone: NEGATIVE  / Bili: Small / Urobili: 0.2 E.U./dL   Blood: x / Protein: NEGATIVE mg/dL / Nitrite: POSITIVE   Leuk Esterase: NEGATIVE / RBC: < 5 /HPF / WBC < 5 /HPF   Sq Epi: x / Non Sq Epi: 0-5 /HPF / Bacteria: Present /HPF        RADIOLOGY & ADDITIONAL TESTS:    < from: CT Brain Stroke Protocol (21 @ 19:14) >  No acute intracranial hemorrhage, mass effect or CT evidence of recent transcortical infarct.    Long-standing small vessel ischemic change and periventricular cerebral white matter, likely associated central-predominant atrophy and ventriculomegaly. Communicating hydrocephalus is not fully excluded, especially without any prior study for comparison, andfurther clinical assessment is advised. Chronic left sphenoid sinusitis.    < end of copied text >    < from: CT Perfusion w/ Maps w/ IV Cont (21 @ 18:18) >  COMPARISON: None    FINDINGS: The CBF <30% volume is reported as 0 mL.  The Tmax > 6s volume is reported as 0 mL. No mismatched thereof.    IMPRESSION: Negative CT perfusion exam of the brain.    < end of copied text >    < from: CT Angio Head w/ IV Cont (21 @ 18:15) >  1. Intracranial CTA shows abrupt lack of contrast filling in the right posterior cerebral artery at the proximal P2 segment for a span of approximately 4 mm (series 8, images 51-52). Filling defect appearance is suggestive of a recent thromboembolic event.    Furthermore, the left posterior cerebral artery P2 segment shows a focal site of moderate there are severe stenosis, likely atherosclerotic in nature.    Also correction to below: There is NO hemodynamically significant stenosis of the internal carotid arteries or its or circulation. Also, the left vertebral artery intracranially and the left PICA fill with contrast, presumably in retrograde supply.    2. Extracranial CTA: Origin of the left vertebral artery is not well seen, especially in the presence of adjacent and more dense incoming venous and craniad-refluxing contrast. However, there is patency seen of the left vertebral artery of the more distal V1 segment, and throughout its V2 and V3 segments, but very faint and discontinuous comparison the more robustly patent right vertebral artery. Greater contrast density is seen distally, suggesting a retrograde source of flow, especially to the left V4 segment and PICA as mentioned above.    < end of copied text >

## 2021-08-25 NOTE — SWALLOW BEDSIDE ASSESSMENT ADULT - PHARYNGEAL PHASE
Hyolaryngeal excursion palpated during swallow trigger; ?timeliness vs incomplete airway closure. Also suspect pharyngeal clearance deficits due to mult swallows palpated per bite/sip

## 2021-08-25 NOTE — PROGRESS NOTE ADULT - ASSESSMENT
80y Male with PMHx of Parkinson's disease, multiple falls, HTN, depression, and anxiety presents to the ED with altered mental status.

## 2021-08-25 NOTE — BH CONSULTATION LIAISON ASSESSMENT NOTE - NSBHCHARTREVIEWLAB_PSY_A_CORE FT
12.1   5.18  )-----------( 159      ( 25 Aug 2021 07:14 )             38.2     08-25    141  |  109<H>  |  20  ----------------------------<  88  3.8   |  24  |  0.96    Ca    8.9      25 Aug 2021 07:14  Phos  3.6     08-25  Mg     2.2     08-25    TPro  6.2  /  Alb  3.4  /  TBili  2.4<H>  /  DBili  1.1<H>  /  AST  276<H>  /  ALT  342<H>  /  AlkPhos  289<H>  08-25    B12, folate, TSH WNL  Utox +benzodiazepines

## 2021-08-25 NOTE — SWALLOW BEDSIDE ASSESSMENT ADULT - COMMENTS
Received awake & alert, sitting up in bed, confused, ?hallucinations, says he sees a bug in the blanket, became mildly agitated when told there was no bug, but he continued to follow simple commands to participate in assessment.

## 2021-08-25 NOTE — PROGRESS NOTE ADULT - ASSESSMENT
80y Male with PMHx of Parkinson's disease, multiple falls, HTN, depression, and anxiety presents to the ED with altered mental status. On exam, patient is stuporous, face is symmetrical, extremity strength grossly equal, withdraws to noxious in all four. Per patient's wife at bedside, patient was in his usual state of health last night when he became more confused at 2100 asking where family members were that no longer lived with them. The patient's wife also states that pt usually manages his own oral medications, but last night he expressed that he might need help moving forward. The patient asked his wife sometime between 0200 and 0400 for Pepto-Bismol which he may have taken, but it was unwitnessed. After this encounter, the patient went back to sleep, and he did not wake up for the rest of the day despite his wife attempting to wake him. CT head with no acute hemorrhage, only significant for small vessel changes. CTA head shows filling defect in right posterior cerebral artery at proximal P2 segment suggestive of recent thromboembolic event.  80y Male with PMHx of Parkinson's disease for over 10 years, multiple falls, HTN, depression, and anxiety presents to the ED with altered mental status. On exam, patient is stuporous, face is symmetrical, extremity strength grossly equal, withdraws to noxious in all four. Per patient's wife at bedside, patient was in his usual state of health last night when he became more confused at 2100 asking where family members were that no longer lived with them. The patient's wife also states that pt usually manages his own oral medications, but last night he expressed that he might need help moving forward. The patient asked his wife sometime between 0200 and 0400 for Pepto-Bismol which he may have taken, but it was unwitnessed. After this encounter, the patient went back to sleep, and he did not wake up for the rest of the day despite his wife attempting to wake him. CT head with no acute hemorrhage, only significant for small vessel changes. CTA head shows filling defect in right posterior cerebral artery at proximal P2 segment suggestive of recent thromboembolic event. Unclear if this finding is the cause for acute change in mental status as patient is being managed for multiple concomitant medical conditions including ?polypharmacy, pancreatitis, gallstones and r/o stroke.       - MR head non con ordered 8/25  - C/w ASA 81 mg, Plavix 75 mg, and high dose statin  - Will need NGT if fails dysphagia for Parkinson's meds   - Recommend stroke neuro checks q4 hours/vital signs q4 hours  - Stroke education  - Stroke neurology will continue to follow      Discussed with neurology attending MD Payan at bedside    80y Male with PMHx of Parkinson's disease for over 10 years, multiple falls, HTN, headaches for many yrs(his wife states since she known him), depression, and anxiety presents to the ED with altered mental status. On exam, patient is stuporous, face is symmetrical, extremity strength grossly equal, withdraws to noxious in all four. Per patient's wife at bedside, patient was in his usual state of health last night when he became more confused at 2100 asking where family members were that no longer lived with them. The patient's wife also states that pt usually manages his own oral medications, but last night he expressed that he might need help moving forward. The patient asked his wife sometime between 0200 and 0400 for Pepto-Bismol which he may have taken, but it was unwitnessed. After this encounter, the patient went back to sleep, and he did not wake up for the rest of the day despite his wife attempting to wake him. CT head with no acute hemorrhage, only significant for small vessel changes. CTA head shows filling defect in right posterior cerebral artery at proximal P2 segment suggestive of recent thromboembolic event. Unclear if this finding is the cause for acute change in mental status as patient is being managed for multiple concomitant medical conditions including ?polypharmacy, pancreatitis, gallstones and r/o stroke.       - MR head non con ordered 8/25  - C/w ASA 81 mg, Plavix 75 mg, and high dose statin  - Will need NGT if fails dysphagia for Parkinson's meds   - infectious/metabolic workup per admitting team  - continue symptomatic headache treatment while inpatient(usually ibuprofen helps per his wife, may consider nurtec as OP)  - Recommend stroke neuro checks q4 hours/vital signs q4 hours  - Stroke education, vascular risk factor management  - Stroke neurology will continue to follow      Discussed with neurology attending MD Payan at bedside

## 2021-08-25 NOTE — PROGRESS NOTE ADULT - PROBLEM SELECTOR PLAN 3
Confirmed on RUQ U/S as patient presented with elevated LFTs, lipase 1065, amylase 625, c/f gallstone pancreatitis  Abd U/S showing normal CBD size and no e/o cholecystitis.  Plan:  - F/u labs  - IV hydration w/ LR @ 50 cc/hr  - GI consulted, f/u recs

## 2021-08-25 NOTE — CONSULT NOTE ADULT - ATTENDING COMMENTS
Possible gallstone pancreatitis.  Need additional imaging -- please check a CT A/P.  Monitor LFTs, WBC. Possible gallstone pancreatitis.  Need additional imaging -- please check a CT A/P -- CBD is 3 mm on abdominal US.  Monitor LFTs, WBC.

## 2021-08-25 NOTE — PROGRESS NOTE ADULT - SUBJECTIVE AND OBJECTIVE BOX
OVERNIGHT EVENTS:    SUBJECTIVE:  Patient seen and examined at bedside.    Vital Signs Last 12 Hrs  T(F): 97.4 (21 @ 00:30), Max: 97.9 (21 @ 22:11)  HR: 58 (21 @ 04:23) (57 - 64)  BP: 118/57 (21 @ 04:23) (118/57 - 184/71)  BP(mean): 81 (21 @ 04:23) (81 - 90)  RR: 19 (21 @ 04:23) (17 - 22)  SpO2: 98% (21 @ 04:23) (95% - 99%)  I&O's Summary    24 Aug 2021 07:01  -  25 Aug 2021 07:00  --------------------------------------------------------  IN: 500 mL / OUT: 600 mL / NET: -100 mL        PHYSICAL EXAM:  Constitutional: NAD, comfortable in bed.  HEENT: NC/AT, PERRLA, EOMI, no conjunctival pallor or scleral icterus, MMM  Neck: Supple, no JVD  Respiratory: CTA B/L. No w/r/r.   Cardiovascular: RRR, normal S1 and S2, no m/r/g.   Gastrointestinal: +BS, soft NTND, no guarding or rebound tenderness, no palpable masses   Extremities: wwp; no cyanosis, clubbing or edema.   Vascular: Pulses equal and strong throughout.   Neurological: AAOx3, no CN deficits, strength and sensation intact throughout.   Skin: No gross skin abnormalities or rashes        LABS:                        12.1   5.18  )-----------( 159      ( 25 Aug 2021 07:14 )             38.2         138  |  106  |  20  ----------------------------<  104<H>  4.4   |  28  |  1.09    Ca    9.5      24 Aug 2021 17:53    TPro  6.8  /  Alb  3.7  /  TBili  3.3<H>  /  DBili  x   /  AST  496<H>  /  ALT  377<H>  /  AlkPhos  272<H>  08-24    PT/INR - ( 24 Aug 2021 17:53 )   PT: 11.6 sec;   INR: 0.97          PTT - ( 24 Aug 2021 17:53 )  PTT:28.0 sec  Urinalysis Basic - ( 24 Aug 2021 18:43 )    Color: Yellow / Appearance: Clear / S.015 / pH: x  Gluc: x / Ketone: NEGATIVE  / Bili: Small / Urobili: 0.2 E.U./dL   Blood: x / Protein: NEGATIVE mg/dL / Nitrite: POSITIVE   Leuk Esterase: NEGATIVE / RBC: < 5 /HPF / WBC < 5 /HPF   Sq Epi: x / Non Sq Epi: 0-5 /HPF / Bacteria: Present /HPF    RADIOLOGY & ADDITIONAL TESTS:    MEDICATIONS  (STANDING):  aspirin enteric coated 81 milliGRAM(s) Oral daily  atorvastatin 80 milliGRAM(s) Oral at bedtime  clopidogrel Tablet 75 milliGRAM(s) Oral daily  enoxaparin Injectable 40 milliGRAM(s) SubCutaneous every 24 hours  lactated ringers. 1000 milliLiter(s) (50 mL/Hr) IV Continuous <Continuous>  thiamine IVPB 500 milliGRAM(s) IV Intermittent every 8 hours    MEDICATIONS  (PRN): OVERNIGHT EVENTS: MILLIE. Patient continued to be obtunded, reactive to noxious stimuli.     SUBJECTIVE:  Patient seen and examined at bedside.     Vital Signs Last 12 Hrs  T(F): 97.4 (21 @ 00:30), Max: 97.9 (21 @ 22:11)  HR: 58 (21 @ 04:23) (57 - 64)  BP: 118/57 (21 @ 04:23) (118/57 - 184/71)  BP(mean): 81 (21 @ 04:23) (81 - 90)  RR: 19 (21 @ 04:23) (17 - 22)  SpO2: 98% (21 @ 04:23) (95% - 99%)  I&O's Summary    24 Aug 2021 07:01  -  25 Aug 2021 07:00  --------------------------------------------------------  IN: 500 mL / OUT: 600 mL / NET: -100 mL        PHYSICAL EXAM:  Constitutional: NAD, comfortable in bed.  HEENT: NC/AT, PERRLA, EOMI, no conjunctival pallor or scleral icterus, MMM  Neck: Supple, no JVD  Respiratory: CTA B/L. No w/r/r.   Cardiovascular: RRR, normal S1 and S2, no m/r/g.   Gastrointestinal: +BS, soft NTND, no guarding or rebound tenderness, no palpable masses   Extremities: wwp; no cyanosis, clubbing or edema.   Vascular: Pulses equal and strong throughout.   Neurological: AAOx3, no CN deficits, strength and sensation intact throughout.   Skin: No gross skin abnormalities or rashes        LABS:                        12.1   5.18  )-----------( 159      ( 25 Aug 2021 07:14 )             38.2         138  |  106  |  20  ----------------------------<  104<H>  4.4   |  28  |  1.09    Ca    9.5      24 Aug 2021 17:53    TPro  6.8  /  Alb  3.7  /  TBili  3.3<H>  /  DBili  x   /  AST  496<H>  /  ALT  377<H>  /  AlkPhos  272<H>  0824    PT/INR - ( 24 Aug 2021 17:53 )   PT: 11.6 sec;   INR: 0.97          PTT - ( 24 Aug 2021 17:53 )  PTT:28.0 sec  Urinalysis Basic - ( 24 Aug 2021 18:43 )    Color: Yellow / Appearance: Clear / S.015 / pH: x  Gluc: x / Ketone: NEGATIVE  / Bili: Small / Urobili: 0.2 E.U./dL   Blood: x / Protein: NEGATIVE mg/dL / Nitrite: POSITIVE   Leuk Esterase: NEGATIVE / RBC: < 5 /HPF / WBC < 5 /HPF   Sq Epi: x / Non Sq Epi: 0-5 /HPF / Bacteria: Present /HPF    RADIOLOGY & ADDITIONAL TESTS:    MEDICATIONS  (STANDING):  aspirin enteric coated 81 milliGRAM(s) Oral daily  atorvastatin 80 milliGRAM(s) Oral at bedtime  clopidogrel Tablet 75 milliGRAM(s) Oral daily  enoxaparin Injectable 40 milliGRAM(s) SubCutaneous every 24 hours  lactated ringers. 1000 milliLiter(s) (50 mL/Hr) IV Continuous <Continuous>  thiamine IVPB 500 milliGRAM(s) IV Intermittent every 8 hours    MEDICATIONS  (PRN): OVERNIGHT EVENTS: MILLIE. Patient continued to be obtunded, reactive to noxious stimuli.    SUBJECTIVE:  Patient seen and examined at bedside.     Vital Signs Last 12 Hrs  T(F): 97.4 (21 @ 00:30), Max: 97.9 (21 @ 22:11)  HR: 58 (21 @ 04:23) (57 - 64)  BP: 118/57 (21 @ 04:23) (118/57 - 184/71)  BP(mean): 81 (21 @ 04:23) (81 - 90)  RR: 19 (21 @ 04:23) (17 - 22)  SpO2: 98% (21 @ 04:23) (95% - 99%)  I&O's Summary    24 Aug 2021 07:01  -  25 Aug 2021 07:00  --------------------------------------------------------  IN: 500 mL / OUT: 600 mL / NET: -100 mL        PHYSICAL EXAM:  Constitutional: NAD, comfortable in bed.  HEENT: NC/AT, PERRLA, EOMI, no conjunctival pallor or scleral icterus, MMM  Neck: Supple, no JVD  Respiratory: CTA B/L. No w/r/r.   Cardiovascular: RRR, normal S1 and S2, no m/r/g.   Gastrointestinal: +BS, soft NTND, no guarding or rebound tenderness, no palpable masses   Extremities: wwp; no cyanosis, clubbing or edema.   Vascular: Pulses equal and strong throughout.   Neurological: AAOx3, no CN deficits, strength and sensation intact throughout.   Skin: No gross skin abnormalities or rashes        LABS:                        12.1   5.18  )-----------( 159      ( 25 Aug 2021 07:14 )             38.2         138  |  106  |  20  ----------------------------<  104<H>  4.4   |  28  |  1.09    Ca    9.5      24 Aug 2021 17:53    TPro  6.8  /  Alb  3.7  /  TBili  3.3<H>  /  DBili  x   /  AST  496<H>  /  ALT  377<H>  /  AlkPhos  272<H>  0824    PT/INR - ( 24 Aug 2021 17:53 )   PT: 11.6 sec;   INR: 0.97          PTT - ( 24 Aug 2021 17:53 )  PTT:28.0 sec  Urinalysis Basic - ( 24 Aug 2021 18:43 )    Color: Yellow / Appearance: Clear / S.015 / pH: x  Gluc: x / Ketone: NEGATIVE  / Bili: Small / Urobili: 0.2 E.U./dL   Blood: x / Protein: NEGATIVE mg/dL / Nitrite: POSITIVE   Leuk Esterase: NEGATIVE / RBC: < 5 /HPF / WBC < 5 /HPF   Sq Epi: x / Non Sq Epi: 0-5 /HPF / Bacteria: Present /HPF    RADIOLOGY & ADDITIONAL TESTS:    MEDICATIONS  (STANDING):  aspirin enteric coated 81 milliGRAM(s) Oral daily  atorvastatin 80 milliGRAM(s) Oral at bedtime  clopidogrel Tablet 75 milliGRAM(s) Oral daily  enoxaparin Injectable 40 milliGRAM(s) SubCutaneous every 24 hours  lactated ringers. 1000 milliLiter(s) (50 mL/Hr) IV Continuous <Continuous>  thiamine IVPB 500 milliGRAM(s) IV Intermittent every 8 hours    MEDICATIONS  (PRN): OVERNIGHT EVENTS: MILLIE. Patient continued to be obtunded, reactive to noxious stimuli.    SUBJECTIVE:  Patient seen and examined at bedside. Very lethargic on exam, unable to answer questions but withdraws from noxious stimuli.     Vital Signs Last 12 Hrs  T(F): 97.4 (21 @ 00:30), Max: 97.9 (21 @ 22:11)  HR: 58 (21 @ 04:23) (57 - 64)  BP: 118/57 (21 @ 04:23) (118/57 - 184/71)  BP(mean): 81 (21 @ 04:23) (81 - 90)  RR: 19 (21 @ 04:23) (17 - 22)  SpO2: 98% (21 @ 04:23) (95% - 99%)  I&O's Summary    24 Aug 2021 07:01  -  25 Aug 2021 07:00  --------------------------------------------------------  IN: 500 mL / OUT: 600 mL / NET: -100 mL    PHYSICAL EXAM:  Constitutional: Lethargic but responsive to noxious stimuli.   HEENT: NC/AT, PERRLA, EOMI, no conjunctival pallor or scleral icterus, MMM  Neck: Supple  Respiratory: CTA B/L. No w/r/r.   Cardiovascular: RRR, normal S1 and S2, no m/r/g.   Gastrointestinal: +BS, soft NTND, no guarding or rebound tenderness, no palpable masses   Extremities: wwp; no cyanosis, clubbing or edema.   Vascular: Pulses equal and strong throughout.   Neurological: Patient sleepy vs obtunded, arousable, barely opens eyes on command, can squeeze hands b/l, unable to assess CN deficits as patient sleeping. Speech slow and mumbled. Unable to assess strength.  Skin: No gross skin abnormalities or rashes    LABS:                        12.1   5.18  )-----------( 159      ( 25 Aug 2021 07:14 )             38.2         138  |  106  |  20  ----------------------------<  104<H>  4.4   |  28  |  1.09    Ca    9.5      24 Aug 2021 17:53    TPro  6.8  /  Alb  3.7  /  TBili  3.3<H>  /  DBili  x   /  AST  496<H>  /  ALT  377<H>  /  AlkPhos  272<H>  08-24    PT/INR - ( 24 Aug 2021 17:53 )   PT: 11.6 sec;   INR: 0.97          PTT - ( 24 Aug 2021 17:53 )  PTT:28.0 sec  Urinalysis Basic - ( 24 Aug 2021 18:43 )    Color: Yellow / Appearance: Clear / S.015 / pH: x  Gluc: x / Ketone: NEGATIVE  / Bili: Small / Urobili: 0.2 E.U./dL   Blood: x / Protein: NEGATIVE mg/dL / Nitrite: POSITIVE   Leuk Esterase: NEGATIVE / RBC: < 5 /HPF / WBC < 5 /HPF   Sq Epi: x / Non Sq Epi: 0-5 /HPF / Bacteria: Present /HPF    RADIOLOGY & ADDITIONAL TESTS:    MEDICATIONS  (STANDING):  aspirin enteric coated 81 milliGRAM(s) Oral daily  atorvastatin 80 milliGRAM(s) Oral at bedtime  clopidogrel Tablet 75 milliGRAM(s) Oral daily  enoxaparin Injectable 40 milliGRAM(s) SubCutaneous every 24 hours  lactated ringers. 1000 milliLiter(s) (50 mL/Hr) IV Continuous <Continuous>  thiamine IVPB 500 milliGRAM(s) IV Intermittent every 8 hours    MEDICATIONS  (PRN):

## 2021-08-25 NOTE — CONSULT NOTE ADULT - ASSESSMENT
80y Male with PMHx of Parkinson's disease, multiple falls, HTN, depression, and anxiety presents to the ED with altered mental status. GI consulted for r/o gallstone pancreatitis.     #R/o gallstone pancreatitis  - patient does have some mild mariano-umbilical pain and an elevated lipase  - no cross sectional imaging, though pancreas looks normal on US  - liver tests elevated, but downtrending  - it is possible that he passed a stone and thus he is improving  - for now, would continue to monitor for pain and trend liver tests    Jennie Beyer MD  PGY-5, Gastroenterology Fellow  pager: [phone] 80y Male with PMHx of Parkinson's disease, multiple falls, HTN, depression, and anxiety presents to the ED with altered mental status. GI consulted for r/o gallstone pancreatitis.     #R/o gallstone pancreatitis  - patient does have some mild mariano-umbilical pain and an elevated lipase  - no cross sectional imaging, though pancreas looks normal on US (not great test to eval pancreas)  - obtain CT abd w/ IV contrast   - liver tests elevated, but downtrending  - it is possible that he passed a stone and thus he is improving  - for now, would continue to monitor for pain and trend liver tests    Jennie Beyer MD  PGY-5, Gastroenterology Fellow  pager: [phone]

## 2021-08-25 NOTE — PROGRESS NOTE ADULT - PROBLEM SELECTOR PLAN 1
Strong suspicion for medication interaction w/ or w/o EtOh, vs overuse vs intentional overdose  Pt has been stuporous since 10AM on day of admission, with last seen normal at 4AM 8/24 after waking up in middle of night and taking pepto bismol (handed by wife) for abdominal pain. Pt drank 2-3 glasses of red wine night before admission (typical usage)  Given negative lactate low s/f for convulsive seizure but c/f non-convulsive leading to post-ictal state.   Lower s/f for intraabdominal pathology causing encephalopathy given lack of SIRS.   Home meds include carbidopa-levodopa, lorazepam,  Plan:  - F/u EEG  - F/u TSH, B12, folate, RPR  - Thiamine, wernicke's dosing given remote hx of etoh  - Restarted carbidopa-levodopa   - F/u Speech and swallow eval (pt had difficulty with bedside swallow on large amounts of water)  - Neuro recs appreciated: MR brain ordered, f/u MRI brain  - CIWA checks

## 2021-08-25 NOTE — BH CONSULTATION LIAISON ASSESSMENT NOTE - NSBHCONSULTFOLLOWAFTERCARE_PSY_A_CORE FT
Pt encouraged to establish outpt psychiatric care in [place] near his home. Please also provide referral information for   [facility]iatric [facility] Adult [facility]  [address], [address]  [place] [address]     •	[facility]  o	[email or web address]  o	Geared towards an aging population, with the understanding that their clients will need increasingly comprehensive services.  Must be over 55.    o	[address] [place], [place] [address] [phone]

## 2021-08-25 NOTE — CONSULT NOTE ADULT - SUBJECTIVE AND OBJECTIVE BOX
GASTROENTEROLOGY CONSULT NOTE  HPI:  80y Male with PMHx of Parkinson's disease, multiple falls, HTN, depression, and anxiety presents to the ED with altered mental status. Collateral obtained by ED from pt's wife who was no longer present upon ICU consult arrival. Per patient's wife, patient was in his usual state of health last night when he became more confused at 2100 asking where family members were that no longer lived with them. The patient's wife also states that pt usually manages his own oral medications, but last night he expressed that he might need help moving forward. The patient asked his wife sometime between 0200 and 0400 for pepto bismal which he may have taken, but it was unwitnessed. After this encounter, the patient went back to sleep, and he did not wake up for the rest of the day despite his wife attempting to wake him. There have been reported increased falls over the past few months and was recently started on 25mg seroquel at home. No reported melena, hematochezia, N/V, hematemesis.    ED vitals: T Afeb   HR 50-60s   RR 18  /94-->164/82  SpO2 98  Labs signficant: No WBC, no lactate, no electrolyte abnormalities. Elevated bilirubin to 3.3, AST//377  Imaging/EKG: Sinus luis eduardo with 1 AVB, no ischemic changes   Interventions: 1L NS  (24 Aug 2021 23:10)    GI consulted for r/o gallstone pancreatitis. Patient seen and examined at bedside.     Allergies    No Known Allergies    Intolerances      Home Medications:  aspirin 81 mg oral tablet: orally once a day (25 Aug 2021 13:24)  carbidopa-levodopa 10 mg-100 mg oral tablet: 1 tab(s) orally 4 times a day (25 Aug 2021 13:24)  desvenlafaxine (as base) 50 mg oral tablet, extended release:  (25 Aug 2021 13:24)  lisinopril 5 mg oral tablet: 1 tab(s) orally once a day (25 Aug 2021 13:24)  LORazepam 0.5 mg oral tablet:  (25 Aug 2021 13:24)  SEROquel 25 mg oral tablet: 1 tab(s) orally once a day (at bedtime) (25 Aug 2021 13:24)    MEDICATIONS:  MEDICATIONS  (STANDING):  aspirin enteric coated 81 milliGRAM(s) Oral daily  atorvastatin 80 milliGRAM(s) Oral at bedtime  carbidopa/levodopa  10/100 1 Tablet(s) Oral every 6 hours  clopidogrel Tablet 75 milliGRAM(s) Oral daily  enoxaparin Injectable 40 milliGRAM(s) SubCutaneous every 24 hours  lactated ringers. 1000 milliLiter(s) (50 mL/Hr) IV Continuous <Continuous>  potassium chloride   Powder 20 milliEquivalent(s) Oral once  thiamine IVPB 500 milliGRAM(s) IV Intermittent every 8 hours    MEDICATIONS  (PRN):    PAST MEDICAL & SURGICAL HISTORY:  Parkinson&#x27;s disease    Hypertension      FAMILY HISTORY:    SOCIAL HISTORY:  Tobacco: [ ] Current, [ ] Former, [ ] Never; Pack Years:  Alcohol:  Illicit Drugs:    REVIEW OF SYSTEMS:  Unable to obtain given mental status.     Vital Signs Last 24 Hrs  T(C): 36.7 (25 Aug 2021 14:04), Max: 36.8 (24 Aug 2021 19:32)  T(F): 98.1 (25 Aug 2021 14:04), Max: 98.3 (24 Aug 2021 19:32)  HR: 63 (25 Aug 2021 13:03) (57 - 67)  BP: 123/69 (25 Aug 2021 13:03) (118/57 - 218/94)  BP(mean): 92 (25 Aug 2021 13:03) (81 - 105)  RR: 18 (25 Aug 2021 13:03) (16 - 22)  SpO2: 97% (25 Aug 2021 13:03) (95% - 99%)    08-24 @ 07:01 - 08-25 @ 07:00  --------------------------------------------------------  IN: 500 mL / OUT: 600 mL / NET: -100 mL    08-25 @ 07:01 - 08-25 @ 16:10  --------------------------------------------------------  IN: 200 mL / OUT: 300 mL / NET: -100 mL        PHYSICAL EXAM:    General: in no acute distress  Eyes: Anicteric sclerae, moist conjunctivae  HENT: Moist mucous membranes  Neck: Trachea midline, supple  Lungs: Normal respiratory effort, no intercostal retractions  Cardiovascular: RRR  Abdomen: Soft, mildly tender mariano-umbilically non-distended; No rebound or guarding  Extremities: Normal range of motion, No clubbing, cyanosis or edema  Neurological: Alert and oriented x2  Skin: Warm and dry. No obvious rash    LABS:                        12.1   5.18  )-----------( 159      ( 25 Aug 2021 07:14 )             38.2     08-25    141  |  109<H>  |  20  ----------------------------<  88  3.8   |  24  |  0.96    Ca    8.9      25 Aug 2021 07:14  Phos  3.6     08-25  Mg     2.2     08-25    TPro  6.2  /  Alb  3.4  /  TBili  2.4<H>  /  DBili  1.1<H>  /  AST  276<H>  /  ALT  342<H>  /  AlkPhos  289<H>  08-25      Culture Results:   No growth at 12 hours (08-24 @ 21:52)  Culture Results:   No growth at 12 hours (08-24 @ 21:52)  Culture Results:   Insignificant amount of mixed growth. (08-24 @ 20:00)    PT/INR - ( 24 Aug 2021 17:53 )   PT: 11.6 sec;   INR: 0.97          PTT - ( 24 Aug 2021 17:53 )  PTT:28.0 sec    Culture - Blood (collected 24 Aug 2021 21:52)  Source: .Blood Blood-Peripheral  Preliminary Report (25 Aug 2021 10:00):    No growth at 12 hours    Culture - Blood (collected 24 Aug 2021 21:52)  Source: .Blood Blood-Peripheral  Preliminary Report (25 Aug 2021 10:00):    No growth at 12 hours    Culture - Urine (collected 24 Aug 2021 20:00)  Source: Clean Catch Clean Catch (Midstream)  Final Report (25 Aug 2021 14:50):    Insignificant amount of mixed growth.      RADIOLOGY & ADDITIONAL STUDIES:     Reviewed

## 2021-08-25 NOTE — BH CONSULTATION LIAISON ASSESSMENT NOTE - DIFFERENTIAL
Delirium, neurocognitive d/o  unspecified depression, r/o mdd, r/o depression 2/2 general medical illness  r/o alcohol use disorder

## 2021-08-25 NOTE — PROGRESS NOTE ADULT - PROBLEM SELECTOR PLAN 4
- Home meds desvenlafaxie  - Pt restarted seroquel 25mg recently per wife, unclear hx  - F/u psych consult given hx of depression

## 2021-08-25 NOTE — BH CONSULTATION LIAISON ASSESSMENT NOTE - HPI (INCLUDE ILLNESS QUALITY, SEVERITY, DURATION, TIMING, CONTEXT, MODIFYING FACTORS, ASSOCIATED SIGNS AND SYMPTOMS)
PPH - pt denies past psychiatric dx, outpt or inpt treatment, denies any h/o SI or suicide attempts.  Pt's wife reports pt with cognitive decline due to Parkinson's (disorientation, short-term memory loss), recent mood/behavioral changes concerning for depression and psychosis (visual hallucinations, increased sleep, social withdrawal). treated by outpt neurologist with ?Pristiq and Seroquel. Wife reports h/o heavy alcohol use, prior martinis, recently 1/2 bottle wine/day. Reports alcohol seems to help with PD symptoms. Denies knowledge of any substance use treatment.     SH - originally from [place], lives in [place] with wife. Has two adult children. Retired .    FH - unknown 81yo man with a reported history of depression, anxiety, Parkinson's disease, recurrent falls, who presented to the ED 8/24 for evaluation of AMS. Per H&P, "per patient's wife, patient was in his usual state of health last night when he became more confused at 2100 asking where family members were that no longer lived with them. The patient's wife also states that pt usually manages his own oral medications, but last night he expressed that he might need help moving forward. The patient asked his wife sometime between 0200 and 0400 for pepto bismal which he may have taken, but it was unwitnessed. After this encounter, the patient went back to sleep, and he did not wake up for the rest of the day despite his wife attempting to wake him. There have been reported increased falls over the past few months and was recently started on 25mg seroquel at home. No reported melena, hematochezia, N/V, hematemesis." Pt found to have elevated transaminases/lipase/amylase, with reported h/o heavy alcohol use. Pt admitted for assessment of AMS etiology, including EEG monitoring and assessment of possible pancreatitis, and sedating medications held. Psychiatry consulted for possible psychiatric contributor to AMS and assessment of mood.    On interview, pt awake, alert, oriented to self, not to location, date, or details of situation. He reported a dx of Parkinson's and coming to the ED because of a fall but did not recall any additional details. He then spontaneously asked for "some wine" and reported drinking 1/2 a bottle of wine/day, did not believe this to be a problem or to have any related health concerns. He described his mood as low for the last 1-2 days, was unable to state whether he has a history of depression, anxiety or other psychiatric diagnosis, and was unable to describe recent symptoms. He did report intermittent auditory and visual hallucinations ("every six weeks") of seeing and hearing his mother; denied current AVH or distress 2/2 this experience. The only medication name he recalled was lorazepam, for which he did not recall indication or frequency of administration. He deferred to his wife (present at bedside) for additional details of history.    Per pt's wife, Nisa, pt with sudden onset AMS prior to ED presentation, unsure of etiology, unsure if pt might have taken extra medications. Pt with more progressive cognitive decline due to Parkinson's (intermittent disorientation, short-term memory loss), recent mood/behavioral changes concerning for depression and psychosis (visual hallucinations, mistaking wife for mother, increased sleep, social withdrawal). treated by outpt neurologist with Pristiq (50mg per chart) and Seroquel 25mg. Pt with increased energy and verbal output since starting Pristiq, periods of oversedation with use of Seroquel, dose decreased to 1/4 of 25mg tab. Wife reports h/o heavy alcohol use, prior martinis, recently 1/2 bottle wine/day, denies recent change. Reports alcohol seems to help with PD symptoms. Denies knowledge of any substance use treatment.     PPH - pt denies past psychiatric dx, outpt or inpt treatment, denies any h/o SI or suicide attempts.      SH - originally from [place], lives in [place] with wife. Has two adult children. Retired .    FH - unknown 81yo man with a reported history of depression, anxiety, Parkinson's disease, recurrent falls, who presented to the ED 8/24 for evaluation of AMS. Per H&P, "per patient's wife, patient was in his usual state of health last night when he became more confused at 2100 asking where family members were that no longer lived with them. The patient's wife also states that pt usually manages his own oral medications, but last night he expressed that he might need help moving forward. The patient asked his wife sometime between 0200 and 0400 for pepto bismal which he may have taken, but it was unwitnessed. After this encounter, the patient went back to sleep, and he did not wake up for the rest of the day despite his wife attempting to wake him. There have been reported increased falls over the past few months and was recently started on 25mg seroquel at home. No reported melena, hematochezia, N/V, hematemesis." Pt found to have elevated transaminases/lipase/amylase, with reported h/o heavy alcohol use. Pt admitted for assessment of AMS etiology, including EEG monitoring and assessment of possible gallstone pancreatitis, and sedating medications held. Psychiatry consulted for possible psychiatric contributor to AMS and assessment of mood.    On interview, pt awake, alert, oriented to self, not to location, date, or details of situation. He reported a dx of Parkinson's and coming to the ED because of a fall but did not recall any additional details. He then spontaneously asked for "some wine" and reported drinking 1/2 a bottle of wine/day, did not believe this to be a problem or to have any related health concerns. He described his mood as low for the last 1-2 days, was unable to state whether he has a history of depression, anxiety or other psychiatric diagnosis, and was unable to describe recent symptoms. He did report intermittent auditory and visual hallucinations ("every six weeks") of seeing and hearing his mother; denied current AVH or distress 2/2 this experience. The only medication name he recalled was lorazepam, for which he did not recall indication or frequency of administration. He deferred to his wife (present at bedside) for additional details of history.    Per pt's wife, Nisa, pt with sudden onset AMS prior to ED presentation, unsure of etiology, unsure if pt might have taken extra medications. Pt with more progressive cognitive decline due to Parkinson's (intermittent disorientation, short-term memory loss), recent mood/behavioral changes concerning for depression and psychosis (visual hallucinations, mistaking wife for mother, increased sleep, social withdrawal). treated by outpt neurologist with Pristiq (50mg per chart) and Seroquel 25mg. Pt with increased energy and verbal output since starting Pristiq, periods of oversedation with use of Seroquel, dose decreased to 1/4 of 25mg tab. Wife reports h/o heavy alcohol use, prior martinis, recently 1/2 bottle wine/day, denies recent change. Reports alcohol seems to help with PD symptoms. Denies knowledge of any substance use treatment.     PPH - pt denies past psychiatric dx, outpt or inpt treatment, denies any h/o SI or suicide attempts.      SH - originally from [place], lives in [place] with wife. Has two adult children. Retired .    FH - unknown

## 2021-08-25 NOTE — BH CONSULTATION LIAISON ASSESSMENT NOTE - SUMMARY
RECOMMENDATIONS  -no need for 1:1 observation or psychiatric admission  -consider resuming outpt Pristiq given discontinuation syndrome (unlikely to be sedating)  -agree with holding outpt Seroquel and Ativan given recent AMS with somnolence   -counseling provided re: alcohol use. recommend CIWA monitoring given risk of withdrawal  -recommend outpt psychiatric evaluation for further assessment of mood and psychotic sx. Referral option provided below  -recommend supervised administration of medications as outpt given cognitive deficits and risk of inadvertent overdose  -delirium precautions  -no psychiatric barrier to discharge. Please contact with questions 79yo man with a reported history of depression, anxiety, Parkinson's disease, recurrent falls, excessive alcohol use who presents with confusion, disorientation, and short-term memory deficits suggestive of likely delirium superimposed on a neurocognitive d/o in setting of hospitalization for evaluation of AMS. Mild depressive symptoms also present per collateral from wife, but difficult to characterize as pt is a limited historian; recent intermittent auditory and visual hallucinations also reported likely due to Parkinson's (and related pharmacotherapy), not present on exam today. Given neurocognitive decline and recent falls, would discourage use of alcohol and benzodiazepines as outpt. As inadvertent medication overdose +/- sensitivity to antipsychotics may have contributed to oversedation prior to presentation, would recommend supervised medication administration at home and further assessment of medication regimen by outpt psychiatrist. agree with holding sedating medications for now. No evidence of any acutely decompensated depression, psychosis, agitation, or other psychiatric d/o that would warrant inpt psychiatric care.    RECOMMENDATIONS  -no need for 1:1 observation or psychiatric admission  -consider resuming outpt Pristiq given risk for discontinuation syndrome (unlikely to be sedating)  -agree with holding outpt Seroquel and Ativan given recent AMS with somnolence   -counseling provided re: alcohol use. recommend CIWA monitoring given risk of withdrawal  -recommend outpt psychiatric evaluation for further assessment of mood and psychotic sx. Referral option provided below  -recommend supervised administration of medications as outpt given cognitive deficits and risk of inadvertent overdose  -delirium precautions  -no psychiatric barrier to discharge. Please contact with questions

## 2021-08-26 DIAGNOSIS — R63.8 OTHER SYMPTOMS AND SIGNS CONCERNING FOOD AND FLUID INTAKE: ICD-10-CM

## 2021-08-26 LAB
ALBUMIN SERPL ELPH-MCNC: 3.6 G/DL — SIGNIFICANT CHANGE UP (ref 3.3–5)
ALP SERPL-CCNC: 343 U/L — HIGH (ref 40–120)
ALT FLD-CCNC: 124 U/L — HIGH (ref 10–45)
ANION GAP SERPL CALC-SCNC: 11 MMOL/L — SIGNIFICANT CHANGE UP (ref 5–17)
AST SERPL-CCNC: 107 U/L — HIGH (ref 10–40)
BASOPHILS # BLD AUTO: 0.06 K/UL — SIGNIFICANT CHANGE UP (ref 0–0.2)
BASOPHILS NFR BLD AUTO: 0.8 % — SIGNIFICANT CHANGE UP (ref 0–2)
BILIRUB SERPL-MCNC: 1.5 MG/DL — HIGH (ref 0.2–1.2)
BUN SERPL-MCNC: 18 MG/DL — SIGNIFICANT CHANGE UP (ref 7–23)
CALCIUM SERPL-MCNC: 9.2 MG/DL — SIGNIFICANT CHANGE UP (ref 8.4–10.5)
CHLORIDE SERPL-SCNC: 105 MMOL/L — SIGNIFICANT CHANGE UP (ref 96–108)
CO2 SERPL-SCNC: 24 MMOL/L — SIGNIFICANT CHANGE UP (ref 22–31)
CREAT SERPL-MCNC: 1.23 MG/DL — SIGNIFICANT CHANGE UP (ref 0.5–1.3)
EOSINOPHIL # BLD AUTO: 0.33 K/UL — SIGNIFICANT CHANGE UP (ref 0–0.5)
EOSINOPHIL NFR BLD AUTO: 4.4 % — SIGNIFICANT CHANGE UP (ref 0–6)
GLUCOSE SERPL-MCNC: 101 MG/DL — HIGH (ref 70–99)
HCT VFR BLD CALC: 41.2 % — SIGNIFICANT CHANGE UP (ref 39–50)
HGB BLD-MCNC: 13.2 G/DL — SIGNIFICANT CHANGE UP (ref 13–17)
IMM GRANULOCYTES NFR BLD AUTO: 0.4 % — SIGNIFICANT CHANGE UP (ref 0–1.5)
LYMPHOCYTES # BLD AUTO: 0.99 K/UL — LOW (ref 1–3.3)
LYMPHOCYTES # BLD AUTO: 13.1 % — SIGNIFICANT CHANGE UP (ref 13–44)
MAGNESIUM SERPL-MCNC: 2 MG/DL — SIGNIFICANT CHANGE UP (ref 1.6–2.6)
MCHC RBC-ENTMCNC: 30.3 PG — SIGNIFICANT CHANGE UP (ref 27–34)
MCHC RBC-ENTMCNC: 32 GM/DL — SIGNIFICANT CHANGE UP (ref 32–36)
MCV RBC AUTO: 94.5 FL — SIGNIFICANT CHANGE UP (ref 80–100)
MONOCYTES # BLD AUTO: 0.59 K/UL — SIGNIFICANT CHANGE UP (ref 0–0.9)
MONOCYTES NFR BLD AUTO: 7.8 % — SIGNIFICANT CHANGE UP (ref 2–14)
NEUTROPHILS # BLD AUTO: 5.54 K/UL — SIGNIFICANT CHANGE UP (ref 1.8–7.4)
NEUTROPHILS NFR BLD AUTO: 73.5 % — SIGNIFICANT CHANGE UP (ref 43–77)
NRBC # BLD: 0 /100 WBCS — SIGNIFICANT CHANGE UP (ref 0–0)
PHOSPHATE SERPL-MCNC: 1.9 MG/DL — LOW (ref 2.5–4.5)
PLATELET # BLD AUTO: 187 K/UL — SIGNIFICANT CHANGE UP (ref 150–400)
POTASSIUM SERPL-MCNC: 3.6 MMOL/L — SIGNIFICANT CHANGE UP (ref 3.5–5.3)
POTASSIUM SERPL-SCNC: 3.6 MMOL/L — SIGNIFICANT CHANGE UP (ref 3.5–5.3)
PROT SERPL-MCNC: 6.8 G/DL — SIGNIFICANT CHANGE UP (ref 6–8.3)
RBC # BLD: 4.36 M/UL — SIGNIFICANT CHANGE UP (ref 4.2–5.8)
RBC # FLD: 12.8 % — SIGNIFICANT CHANGE UP (ref 10.3–14.5)
SODIUM SERPL-SCNC: 140 MMOL/L — SIGNIFICANT CHANGE UP (ref 135–145)
WBC # BLD: 7.54 K/UL — SIGNIFICANT CHANGE UP (ref 3.8–10.5)
WBC # FLD AUTO: 7.54 K/UL — SIGNIFICANT CHANGE UP (ref 3.8–10.5)

## 2021-08-26 PROCEDURE — 74177 CT ABD & PELVIS W/CONTRAST: CPT | Mod: 26

## 2021-08-26 PROCEDURE — 99233 SBSQ HOSP IP/OBS HIGH 50: CPT | Mod: GC

## 2021-08-26 PROCEDURE — 99223 1ST HOSP IP/OBS HIGH 75: CPT | Mod: GC

## 2021-08-26 PROCEDURE — 74230 X-RAY XM SWLNG FUNCJ C+: CPT | Mod: 26

## 2021-08-26 RX ORDER — SODIUM CHLORIDE 9 MG/ML
1000 INJECTION, SOLUTION INTRAVENOUS
Refills: 0 | Status: DISCONTINUED | OUTPATIENT
Start: 2021-08-26 | End: 2021-08-26

## 2021-08-26 RX ORDER — DESVENLAFAXINE 50 MG/1
50 TABLET, EXTENDED RELEASE ORAL EVERY 24 HOURS
Refills: 0 | Status: DISCONTINUED | OUTPATIENT
Start: 2021-08-26 | End: 2021-08-27

## 2021-08-26 RX ORDER — POTASSIUM CHLORIDE 20 MEQ
40 PACKET (EA) ORAL ONCE
Refills: 0 | Status: COMPLETED | OUTPATIENT
Start: 2021-08-26 | End: 2021-08-26

## 2021-08-26 RX ORDER — SODIUM CHLORIDE 9 MG/ML
1000 INJECTION, SOLUTION INTRAVENOUS
Refills: 0 | Status: DISCONTINUED | OUTPATIENT
Start: 2021-08-26 | End: 2021-08-27

## 2021-08-26 RX ORDER — THIAMINE MONONITRATE (VIT B1) 100 MG
100 TABLET ORAL DAILY
Refills: 0 | Status: DISCONTINUED | OUTPATIENT
Start: 2021-08-26 | End: 2021-08-27

## 2021-08-26 RX ADMIN — CARBIDOPA AND LEVODOPA 1 TABLET(S): 25; 100 TABLET ORAL at 11:11

## 2021-08-26 RX ADMIN — Medication 40 MILLIEQUIVALENT(S): at 14:34

## 2021-08-26 RX ADMIN — CARBIDOPA AND LEVODOPA 1 TABLET(S): 25; 100 TABLET ORAL at 03:39

## 2021-08-26 RX ADMIN — Medication 81 MILLIGRAM(S): at 11:05

## 2021-08-26 RX ADMIN — Medication 85 MILLIMOLE(S): at 16:10

## 2021-08-26 RX ADMIN — Medication 105 MILLIGRAM(S): at 16:11

## 2021-08-26 RX ADMIN — ATORVASTATIN CALCIUM 80 MILLIGRAM(S): 80 TABLET, FILM COATED ORAL at 22:34

## 2021-08-26 RX ADMIN — SODIUM CHLORIDE 50 MILLILITER(S): 9 INJECTION, SOLUTION INTRAVENOUS at 19:06

## 2021-08-26 RX ADMIN — CARBIDOPA AND LEVODOPA 1 TABLET(S): 25; 100 TABLET ORAL at 22:34

## 2021-08-26 RX ADMIN — CLOPIDOGREL BISULFATE 75 MILLIGRAM(S): 75 TABLET, FILM COATED ORAL at 11:05

## 2021-08-26 RX ADMIN — Medication 105 MILLIGRAM(S): at 00:30

## 2021-08-26 RX ADMIN — ENOXAPARIN SODIUM 40 MILLIGRAM(S): 100 INJECTION SUBCUTANEOUS at 10:59

## 2021-08-26 RX ADMIN — DESVENLAFAXINE 50 MILLIGRAM(S): 50 TABLET, EXTENDED RELEASE ORAL at 12:49

## 2021-08-26 RX ADMIN — Medication 105 MILLIGRAM(S): at 10:59

## 2021-08-26 NOTE — PROGRESS NOTE ADULT - SUBJECTIVE AND OBJECTIVE BOX
OVERNIGHT EVENTS: Patient agitated overnight per nurse. Did not sleep much. Pt given seroquel overnight. MR brain and MBS to be done tentatively today.     SUBJECTIVE:  Patient seen and examined at bedside. Patient denies fever, chills, weakness, HA, vision changes, chest pain, palpitations, LE edema, SOB, cough, abdominal pain, n/v/c/d.    Vital Signs Last 12 Hrs  T(F): 97.2 (21 @ 11:20), Max: 97.2 (21 @ 11:20)  HR: 90 (21 @ 05:26) (90 - 90)  BP: 159/93 (21 @ 05:26) (159/93 - 159/93)  BP(mean): 119 (21 @ 05:26) (119 - 119)  RR: 23 (21 @ 05:26) (23 - 23)  SpO2: 98% (21 @ 05:26) (98% - 98%)  I&O's Summary    25 Aug 2021 07:  -  26 Aug 2021 07:00  --------------------------------------------------------  IN: 200 mL / OUT: 600 mL / NET: -400 mL    26 Aug 2021 07:  -  26 Aug 2021 12:32  --------------------------------------------------------  IN: 275 mL / OUT: 250 mL / NET: 25 mL    PHYSICAL EXAM:  Constitutional: AAOx2 to name and place. Much more awake and conversant than previous exams.  HEENT: NC/AT, PERRLA, EOMI, no conjunctival pallor or scleral icterus, MMM  Neck: Supple  Respiratory: CTA B/L. No w/r/r.   Cardiovascular: RRR, normal S1 and S2, no m/r/g.   Gastrointestinal: +BS, soft NTND, no guarding or rebound tenderness, no palpable masses   Extremities: wwp; no cyanosis, clubbing or edema.   Vascular: Pulses equal and strong throughout.   Neurological: AAOx1, no focal deficits, no CN deficits, motor and sensation intact. Pt exhibiting some agitation symptoms intermittently.   Skin: No gross skin abnormalities or rashes    LABS:                        13.2   7.54  )-----------( 187      ( 26 Aug 2021 06:15 )             41.2         140  |  105  |  18  ----------------------------<  101<H>  3.6   |  24  |  1.23    Ca    9.2      26 Aug 2021 06:15  Phos  1.9       Mg     2.0         TPro  6.8  /  Alb  3.6  /  TBili  1.5<H>  /  DBili  x   /  AST  107<H>  /  ALT  124<H>  /  AlkPhos  343<H>      PT/INR - ( 24 Aug 2021 17:53 )   PT: 11.6 sec;   INR: 0.97         PTT - ( 24 Aug 2021 17:53 )  PTT:28.0 sec  Urinalysis Basic - ( 24 Aug 2021 18:43 )    Color: Yellow / Appearance: Clear / S.015 / pH: x  Gluc: x / Ketone: NEGATIVE  / Bili: Small / Urobili: 0.2 E.U./dL   Blood: x / Protein: NEGATIVE mg/dL / Nitrite: POSITIVE   Leuk Esterase: NEGATIVE / RBC: < 5 /HPF / WBC < 5 /HPF   Sq Epi: x / Non Sq Epi: 0-5 /HPF / Bacteria: Present /HPF          RADIOLOGY & ADDITIONAL TESTS:    MEDICATIONS  (STANDING):  aspirin enteric coated 81 milliGRAM(s) Oral daily  atorvastatin 80 milliGRAM(s) Oral at bedtime  carbidopa/levodopa  10/100 1 Tablet(s) Oral every 6 hours  clopidogrel Tablet 75 milliGRAM(s) Oral daily  enoxaparin Injectable 40 milliGRAM(s) SubCutaneous every 24 hours  lactated ringers. 1000 milliLiter(s) (50 mL/Hr) IV Continuous <Continuous>  thiamine IVPB 500 milliGRAM(s) IV Intermittent every 8 hours    MEDICATIONS  (PRN):   HOSPITAL COURSE: 80y Male with PMHx of Parkinson's disease (on cinamet), multiple falls, HTN, depression, and anxiety  presents to the ED with altered mental status. Per wife, patient complained of abdominal pain night PTA, when he took pepto bismol (given by wife) and then went to sleep, but was unarousable the next AM (only responsive to painful stimuli). Wife notes pt manages his own meds and has also reported increased falls over the past few months. Pt was also recently started on 25mg seroquel at home for depression. In ED, /94 which decreased without any intervention to 164/82. 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 Only pertinent labs in ED were elevated LFTS AST//377 and elevated bili 3.3. Pt s/p 1L NS in ED. ICU consulted for c/f of encephalopathy i/s/o medication overuse or overdose. Pt admitted to [facility]. Neuro consulted for c/f stroke/intracranial pathology (no concern for seizures), recommended MRI, ordered but not yet performed. Psych consulted w/ c/f depression, recommends holding seroquel (1 dose already given), and to restart desvenlafaxine. Gi consulted for elevated LFTs/c/f gallstone pancreatitis, recommended CTAbdpel (which was negative for gallstones) with no acute intervention currently. Speech and swallow evaluated, pt underwent modified barium swallow recommending pureed diet (nectar, lowfat). Patient now AAOx2, stable for stepdown to F.     OVERNIGHT EVENTS: Patient agitated overnight per nurse. Did not sleep much. Pt given seroquel overnight. MR brain and MBS to be done tentatively today.     SUBJECTIVE:  Patient seen and examined at bedside. Patient denies fever, chills, weakness, HA, vision changes, chest pain, palpitations, LE edema, SOB, cough, abdominal pain, n/v/c/d.    Vital Signs Last 12 Hrs  T(F): 97.2 (21 @ 11:20), Max: 97.2 (21 @ 11:20)  HR: 90 (21 @ 05:26) (90 - 90)  BP: 159/93 (21 @ 05:26) (159/93 - 159/93)  BP(mean): 119 (21 @ 05:26) (119 - 119)  RR: 23 (21 @ 05:26) (23 - 23)  SpO2: 98% (21 @ 05:26) (98% - 98%)  I&O's Summary    25 Aug 2021 07:  -  26 Aug 2021 07:00  --------------------------------------------------------  IN: 200 mL / OUT: 600 mL / NET: -400 mL    26 Aug 2021 07:  -  26 Aug 2021 12:32  --------------------------------------------------------  IN: 275 mL / OUT: 250 mL / NET: 25 mL    PHYSICAL EXAM:  Constitutional: AAOx2 to name and place. Much more awake and conversant than previous exams.  HEENT: NC/AT, PERRLA, EOMI, no conjunctival pallor or scleral icterus, MMM  Neck: Supple  Respiratory: CTA B/L. No w/r/r.   Cardiovascular: RRR, normal S1 and S2, no m/r/g.   Gastrointestinal: +BS, soft NTND, no guarding or rebound tenderness, no palpable masses   Extremities: wwp; no cyanosis, clubbing or edema.   Vascular: Pulses equal and strong throughout.   Neurological: AAOx1, no focal deficits, no CN deficits, motor and sensation intact. Pt exhibiting some agitation symptoms intermittently.   Skin: No gross skin abnormalities or rashes    LABS:                        13.2   7.54  )-----------( 187      ( 26 Aug 2021 06:15 )             41.2         140  |  105  |  18  ----------------------------<  101<H>  3.6   |  24  |  1.23    Ca    9.2      26 Aug 2021 06:15  Phos  1.9       Mg     2.0         TPro  6.8  /  Alb  3.6  /  TBili  1.5<H>  /  DBili  x   /  AST  107<H>  /  ALT  124<H>  /  AlkPhos  343<H>      PT/INR - ( 24 Aug 2021 17:53 )   PT: 11.6 sec;   INR: 0.97         PTT - ( 24 Aug 2021 17:53 )  PTT:28.0 sec  Urinalysis Basic - ( 24 Aug 2021 18:43 )    Color: Yellow / Appearance: Clear / S.015 / pH: x  Gluc: x / Ketone: NEGATIVE  / Bili: Small / Urobili: 0.2 E.U./dL   Blood: x / Protein: NEGATIVE mg/dL / Nitrite: POSITIVE   Leuk Esterase: NEGATIVE / RBC: < 5 /HPF / WBC < 5 /HPF   Sq Epi: x / Non Sq Epi: 0-5 /HPF / Bacteria: Present /HPF          RADIOLOGY & ADDITIONAL TESTS:    MEDICATIONS  (STANDING):  aspirin enteric coated 81 milliGRAM(s) Oral daily  atorvastatin 80 milliGRAM(s) Oral at bedtime  carbidopa/levodopa  10/100 1 Tablet(s) Oral every 6 hours  clopidogrel Tablet 75 milliGRAM(s) Oral daily  enoxaparin Injectable 40 milliGRAM(s) SubCutaneous every 24 hours  lactated ringers. 1000 milliLiter(s) (50 mL/Hr) IV Continuous <Continuous>  thiamine IVPB 500 milliGRAM(s) IV Intermittent every 8 hours    MEDICATIONS  (PRN):

## 2021-08-26 NOTE — PROGRESS NOTE ADULT - ATTENDING COMMENTS
More awake and alert. continue gentle hysration. Speech and swallow to follow up and GI. Await barium swallow and Ct abd results. Abd remains benign. Await MRI brain.

## 2021-08-26 NOTE — DIETITIAN INITIAL EVALUATION ADULT. - ADD RECOMMEND
1. As medically feasible, advance diet to Low Fat diet (consistency per SLP recommendations) 2. Encourage pt to meet nutritional needs as able 3. Monitor need for multivitamin 4. Provide assistance with meals as needed 5. Monitor bowel movement; initiate regimen as needed 6. RD to honor preferences as able 1. As medically feasible, advance diet to Low Fat diet (consistency per SLP recommendations) 2. Encourage pt to meet nutritional needs as able 3. Monitor need for multivitamin (continue micronutrient supplementation) 4. Provide assistance with meals as needed 5. Monitor bowel movement; initiate regimen as needed 6. RD to honor preferences as able

## 2021-08-26 NOTE — DIETITIAN INITIAL EVALUATION ADULT. - PERSON TAUGHT/METHOD
adequate PO intake to meet nutritional needs/verbal instruction/patient instructed/spouse instructed

## 2021-08-26 NOTE — SWALLOW VFSS/MBS ASSESSMENT ADULT - CONSISTENCIES ADMINISTERED
thin liquid via tsp x2, nectar thick liquid via tsp x 3, puree via tsp x 1, and nectar-thick liquid via cup x 1

## 2021-08-26 NOTE — PROGRESS NOTE ADULT - PROBLEM SELECTOR PLAN 4
- Home meds desvenlafaxie  - Pt restarted seroquel 25mg recently per wife, unclear hx  - F/u psych consult given hx of depression - Home meds desvenlafaxine  Plan:  - restarted desvenlafaxine 8/26  - Psych recs appreciated  - Holding seroquel 25mg per psych recs (last dose 8/25 PM)

## 2021-08-26 NOTE — PROGRESS NOTE ADULT - PROBLEM SELECTOR PLAN 1
Strong suspicion for medication interaction w/ or w/o EtOh, vs overuse vs intentional overdose  Pt has been stuporous since 10AM on day of admission, with last seen normal at 4AM 8/24 after waking up in middle of night and taking pepto bismol (handed by wife) for abdominal pain. Pt drank 2-3 glasses of red wine night before admission (typical usage)  Given negative lactate low s/f for convulsive seizure but c/f non-convulsive leading to post-ictal state.   Lower s/f for intraabdominal pathology causing encephalopathy given lack of SIRS.   Home meds include carbidopa-levodopa, lorazepam,  Plan:  - F/u EEG  - F/u TSH, B12, folate, RPR  - Thiamine, wernicke's dosing given remote hx of etoh  - Restarted carbidopa-levodopa   - F/u Speech and swallow eval (pt had difficulty with bedside swallow on large amounts of water)  - Neuro recs appreciated: MR brain ordered, f/u MRI brain  - CIWA checks Strong suspicion for medication interaction w/ or w/o EtOh, vs overuse vs intentional overdose  Pt has been stuporous since 10AM on day of admission, with last seen normal at 4AM 8/24 after waking up in middle of night and taking pepto bismol (handed by wife) for abdominal pain. Pt drank 2-3 glasses of red wine night before admission (typical usage)  Given negative lactate low s/f for convulsive seizure but c/f non-convulsive leading to post-ictal state.   Lower s/f for intraabdominal pathology causing encephalopathy given lack of SIRS.   s/p Thiamine, wernicke's dosing given remote hx of etoh  Home meds include carbidopa-levodopa, lorazepam  EEG cancelled as no concern for seizure per medicine and neuro recs  Plan  - F/u TSH, B12, folate, RPR  - Restarted carbidopa-levodopa   - F/u MBS results  - Neuro recs appreciated:MR brain ordered, f/u MRI brain  - CIWA checks

## 2021-08-26 NOTE — PROGRESS NOTE ADULT - PROBLEM SELECTOR PLAN 1
Strong suspicion for medication interaction w/ or w/o EtOh, vs overuse vs intentional overdose  Pt has been stuporous since 10AM on day of admission, with last seen normal at 4AM 8/24 after waking up in middle of night and taking pepto bismol (handed by wife) for abdominal pain. Pt drank 2-3 glasses of red wine night before admission (typical usage)  Given negative lactate low s/f for convulsive seizure but c/f non-convulsive leading to post-ictal state.   Lower s/f for intraabdominal pathology causing encephalopathy given lack of SIRS.   s/p Thiamine, wernicke's dosing given remote hx of etoh  Home meds include carbidopa-levodopa, lorazepam  EEG cancelled as no concern for seizure per medicine and neuro recs  TSH, B12, folate wnl  MBS recommending pureed diet     Plan  - F/u RPR  - Restarted carbidopa-levodopa   - Neuro recs appreciated: MR brain ordered, f/u MRI brain  - CIWA checks  - daily thiamine and multivitamin Strong suspicion for medication interaction w/ or w/o EtOh, vs overuse vs intentional overdose  Pt drank 2-3 glasses of red wine night before admission (typical usage)  Given negative lactate low s/f for convulsive seizure but c/f non-convulsive leading to post-ictal state.   Lower s/f for intraabdominal pathology causing encephalopathy given lack of SIRS.   TSH, B12, folate wnl  CTH negative for stroke, hemorrhage, or mass effect  CT neck s/f L cervical vertebral neck artery narrowing     Plan  - F/u RPR  - Restarted carbidopa-levodopa   - Neuro recs appreciated: MR brain ordered, f/u MRI brain  - CIWA checks  - daily thiamine and multivitamin  - continue aspirin 81 mg daily, clopidogrel 75 mg daily, and atorvastatin 80 mg daily

## 2021-08-26 NOTE — SWALLOW VFSS/MBS ASSESSMENT ADULT - DIAGNOSTIC IMPRESSIONS
Patient is an 81 yo M with 10+ year history of PD, admitted with AMS.  On today's exam, patient with reduced acceptance of po trials, requiring max encouragement and prompting to take po.  He exhibits a moderate oropharyngeal dysphagia.  Oral phase marked by oral bolus holding and slowed oral processing/bolus formation and transport.  Pharyngeal phase of swallow characterized by reduced pharyngeal swallow efficiency and airway protection with deep penetration ( with eventual silent aspiration) of thin liquid and trace or transient penetration with nectar-thick liquid trials. Patient's dysphagia and his AMS places him at risk for aspiration.  Poor po intake is also likely.  Patient would benefit from a restricted po diet with careful feeding strategies to reduce aspiration risk as much as possible.

## 2021-08-26 NOTE — SWALLOW VFSS/MBS ASSESSMENT ADULT - SLP PERTINENT HISTORY OF CURRENT PROBLEM
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  Pt seen by our service 8/25 for clinical swallow exam which showed suspected pharyngeal dysphagia with overt signs & symptoms of airway protection deficits on thin and nectar liquid trials.

## 2021-08-26 NOTE — PROGRESS NOTE ADULT - PROBLEM SELECTOR PLAN 5
Home meds losartan 5mg  currently held as pt is normotensive 123/69 Home meds losartan 5mg  Pt has occasinally episodes of 150s SBP likely due to agitation   Plan:  Hold losartan in meanwhile

## 2021-08-26 NOTE — PROGRESS NOTE ADULT - SUBJECTIVE AND OBJECTIVE BOX
Transfer from [facility] to [facility]    HOSPITAL COURSE: 80y Male with PMHx of Parkinson's disease (on cinamet), multiple falls, HTN, depression, and anxiety  presents to the ED with altered mental status. Per wife, patient complained of abdominal pain night PTA, when he took pepto bismol (given by wife) and then went to sleep, but was unarousable the next AM (only responsive to painful stimuli). Wife notes pt manages his own meds and has also reported increased falls over the past few months. Pt was also recently started on 25mg seroquel at home for depression. Of note, patient reported auditory and visual hallucinations of his mother for the past 6 weeks. In ED, /94 which decreased without any intervention to 164/82. 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 Only pertinent labs in ED were elevated LFTS AST//377 and elevated bili 3.3. Pt s/p 1L NS in ED. ICU consulted for c/f of encephalopathy i/s/o medication overuse or overdose. Pt admitted to [facility]. Neuro consulted for c/f stroke/intracranial pathology (no concern for seizures), recommended MRI, ordered but not yet performed. Psych consulted w/ c/f depression, recommends holding seroquel (1 dose already given), and to restart desvenlafaxine. Gi consulted for elevated LFTs/c/f gallstone pancreatitis, recommended CTAbdpel (which was negative for gallstones) with no acute intervention currently. Speech and swallow evaluated, pt underwent modified barium swallow recommending pureed diet (nectar, lowfat). Patient now AAOx2, stable for stepdown to [facility].         LESLIE DAVIS, 80y, Male  MRN-[id number]  Patient is a 80y old  Male who presents with a chief complaint of altered mental status (26 Aug 2021 13:56)      OVERNIGHT EVENTS: Per [facility] note, patient was agitated overnight, did not sleep much, and was given seroquel.     SUBJECTIVE: Patient seen and examined at bedside. Patient history limited by confusion and altered mental status.     12 Point ROS Not obtained due to confusion and altered mental status.   -------------------------------------------------------------------------------  VITAL SIGNS:  Vital Signs Last 24 Hrs  T(C): 36.5 (26 Aug 2021 17:37), Max: 36.6 (25 Aug 2021 22:03)  T(F): 97.7 (26 Aug 2021 17:37), Max: 97.9 (25 Aug 2021 22:03)  HR: 76 (26 Aug 2021 16:30) (66 - 90)  BP: 120/60 (26 Aug 2021 16:30) (105/58 - 165/94)  BP(mean): 81 (26 Aug 2021 16:30) (78 - 121)  RR: 18 (26 Aug 2021 16:30) (18 - 23)  SpO2: 97% (26 Aug 2021 16:30) (96% - 98%)  I&O's Summary    25 Aug 2021 07:01  -  26 Aug 2021 07:00  --------------------------------------------------------  IN: 200 mL / OUT: 600 mL / NET: -400 mL    26 Aug 2021 07:01  -  26 Aug 2021 20:08  --------------------------------------------------------  IN: 575 mL / OUT: 250 mL / NET: 325 mL        PHYSICAL EXAM:    General: NAD, well developed  HEENT: NC/AT; EOMI, PERRLA, anicteric sclera; moist mucosal membranes.  Neck: supple, trachea midline  Cardiovascular: RRR, +S1/S2; NO M/R/G  Respiratory: CTA B/L; no W/R/R  Gastrointestinal: soft, NT/ND; +BSx4  Extremities: WWP; no edema or cyanosis  Vascular: 2+ radial, DP/PT pulses B/L  Neurological: AAOx3; no focal deficits    ALLERGIES:  Allergies    No Known Allergies    Intolerances        MEDICATIONS:  MEDICATIONS  (STANDING):  aspirin enteric coated 81 milliGRAM(s) Oral daily  atorvastatin 80 milliGRAM(s) Oral at bedtime  carbidopa/levodopa  10/100 1 Tablet(s) Oral every 6 hours  clopidogrel Tablet 75 milliGRAM(s) Oral daily  desvenlafaxine ER 50 milliGRAM(s) Oral every 24 hours  enoxaparin Injectable 40 milliGRAM(s) SubCutaneous every 24 hours  lactated ringers. 1000 milliLiter(s) (50 mL/Hr) IV Continuous <Continuous>  multivitamin 1 Tablet(s) Oral daily  thiamine 100 milliGRAM(s) Oral daily    MEDICATIONS  (PRN):      -------------------------------------------------------------------------------  LABS:                        13.2   7.54  )-----------( 187      ( 26 Aug 2021 06:15 )             41.2     08-26    140  |  105  |  18  ----------------------------<  101<H>  3.6   |  24  |  1.23    Ca    9.2      26 Aug 2021 06:15  Phos  1.9     08-26  Mg     2.0     08-26    TPro  6.8  /  Alb  3.6  /  TBili  1.5<H>  /  DBili  x   /  AST  107<H>  /  ALT  124<H>  /  AlkPhos  343<H>  08-26    LIVER FUNCTIONS - ( 26 Aug 2021 06:15 )  Alb: 3.6 g/dL / Pro: 6.8 g/dL / ALK PHOS: 343 U/L / ALT: 124 U/L / AST: 107 U/L / GGT: x               CAPILLARY BLOOD GLUCOSE          Culture - Blood (collected 24 Aug 2021 21:52)  Source: .Blood Blood-Peripheral  Preliminary Report (25 Aug 2021 22:00):    No growth at 1 day.    Culture - Blood (collected 24 Aug 2021 21:52)  Source: .Blood Blood-Peripheral  Preliminary Report (25 Aug 2021 22:00):    No growth at 1 day.    Culture - Urine (collected 24 Aug 2021 20:00)  Source: Clean Catch Clean Catch (Midstream)  Final Report (25 Aug 2021 14:50):    Insignificant amount of mixed growth.      COVID-19 PCR: NotDetec (24 Aug 2021 18:44)      RADIOLOGY & ADDITIONAL TESTS: Reviewed.

## 2021-08-26 NOTE — PROGRESS NOTE ADULT - PROBLEM SELECTOR PLAN 3
Confirmed on RUQ U/S as patient presented with elevated LFTs, lipase 1065, amylase 625, c/f gallstone pancreatitis  Abd U/S showing normal CBD size and no e/o cholecystitis.  Plan:  - F/u labs  - IV hydration w/ LR @ 50 cc/hr  - GI consulted, f/u recs Confirmed on RUQ U/S as patient presented with elevated LFTs, lipase 1065, amylase 625, c/f gallstone pancreatitis  Abd U/S showing normal CBD size and no e/o cholecystitis.  LFTs downtrending  Alk phos increasing  No RUQ pain per pt although poor historian  Plan:  - IV hydration w/ LR @ 50 cc/hr  - GI consulted, recs appreciated - elevated LFTs downtrending possible gallstone passed  - F/u results of CT abd/pelvis

## 2021-08-26 NOTE — SWALLOW VFSS/MBS ASSESSMENT ADULT - RECOMMENDED FEEDING/EATING TECHNIQUES
Do not feed when patient is agitated.  Do not force feed./allow for swallow between intakes/alternate food with liquid/check mouth frequently for oral residue/pocketing/crush medication (when feasible)/maintain upright posture during/after eating for 30 mins/no straws/oral hygiene/position upright (90 degrees)/provide rest periods between swallows/small sips/bites Puree and liquid via teaspoon only.  No cup or straw drinking.  Do not feed when patient is agitated.  Do not force feed./allow for swallow between intakes/alternate food with liquid/check mouth frequently for oral residue/pocketing/crush medication (when feasible)/maintain upright posture during/after eating for 30 mins/no straws/oral hygiene/position upright (90 degrees)/provide rest periods between swallows/small sips/bites

## 2021-08-26 NOTE — PROGRESS NOTE ADULT - ATTENDING COMMENTS
80y Male with PMHx of Parkinson's disease, multiple falls, HTN, depression, and anxiety presents to the ED with altered mental status.     #Encephaolpathy: admitted to [facility] for AMS likely 2/2 polypharmacy (?benzo use, seroquel) in setting of alcohol use; infectious etiology unlikely; stroke protocol w/ filling defect in right posterior cerebral artery- neuro following, c/w asa/plavix, low suspicion for acute stroke and OK for SD per neuro, f/up MRI brain. F/up SS recs, c/w pureed diet.   #Tranaminitis: w/ elevated bili; now improved. +Gallstones on Abd US; CTAP reviwed, no pancreattiis, CBD dilation. GI following, suspect possible passed stone as LFTs improved, continue to monitor

## 2021-08-26 NOTE — PROGRESS NOTE ADULT - PROBLEM SELECTOR PLAN 4
- Home meds desvenlafaxine  Plan:  - restarted desvenlafaxine 8/26  - Psych recs appreciated  - Holding seroquel 25mg per psych recs (last dose 8/25 PM)

## 2021-08-26 NOTE — PROGRESS NOTE ADULT - PROBLEM SELECTOR PLAN 5
Home meds losartan 5mg  Pt has occasinally episodes of 150s SBP likely due to agitation   Losartan held due to TOBIN (increase in Cr of 20% to 1.23 from 1.09 on admission)  Plan:  Hold losartan in setting of TOBIN  restart losartan if BP increases Home meds losartan 5mg  Losartan held due to TOBIN (increase in Cr of 20% to 1.23 from 1.09 on admission)  Plan:  Hold losartan in setting of TOBIN  restart losartan if BP increases

## 2021-08-26 NOTE — DIETITIAN INITIAL EVALUATION ADULT. - OTHER INFO
80y Male with PMHx of Parkinson's disease, multiple falls, HTN, depression, and anxiety presents to the ED with altered mental status. Noted with possible gallstone pancreatitis. S/p MBS 8/26 recommending "Pureed diet with nectar-thick liquids VIA TEASPOON ONLY. allow for swallow between intakes; alternate food with liquid; check mouth frequently for oral residue/pocketing; crush medication (when feasible); maintain upright posture during/after eating for 30 mins; no straws; oral hygiene; position upright (90 degrees); provide rest periods between swallows; small sips/bites; Puree and liquid via teaspoon only.  No cup or straw drinking.  Do not feed when patient is agitated.  Do not force feed."     Pt and wife (Nisa) seen OOB in chair for initial assessment. Denies nausea/vomiting at this time. Reports last bowel movement 8/23. Confirms NKFA. Denies change in appetite PTA; endorses 3 meals/day at home (sandwich, soup, chicken fish). Reports usual body weight 160 pounds (relatively consistent with dosing weight 166 pounds). Verbalizes no recent weight loss. No edema documented at this time. No pressure ulcers documented at this time. Rolando score=15. Pt with mild hypophosphatemia; sodium phosphate ordered. All other electrolytes within normal limits at this time. Observed pt with no overt signs of muscle or fat wasting. Based on ASPEN guidelines, pt does not meet criteria for malnutrition at this time. Discussed current diet order NPO; provided pt with diet education regarding likelihood of diet advancement per SLP recommendations: Pureed Diet with nectar thickened liquids via teaspoon; amenable to education. Pt reports feeling hungry during hospital stay. Discussed trial of ONS to optimize nutritional status; pt willing to trial Ensure Max 2x/day (150 kcal, 30 g protein per serving). Made aware RD remains available. RD to follow up per protocol. See nutrition recommendations below.

## 2021-08-26 NOTE — PROGRESS NOTE ADULT - ASSESSMENT
80y Male with PMHx of Parkinson's disease, multiple falls, HTN, depression, and anxiety presents to the ED with altered mental status. GI consulted for r/o gallstone pancreatitis.     #R/o gallstone pancreatitis  - patient does have some mild mariano-umbilical pain and an elevated lipase  - no cross sectional imaging, though pancreas looks normal on US (not great test to eval pancreas)  - f/u CT abd w/ IV contrast   - liver tests elevated, but downtrending  - it is possible that he passed a stone and thus he is improving  - for now, would continue to monitor for pain and trend liver tests    Jennie Beyer MD  PGY-5, Gastroenterology Fellow  pager: [phone]

## 2021-08-26 NOTE — PROGRESS NOTE ADULT - PROBLEM SELECTOR PLAN 6
F: lactate ringers  E: replete if K<4, Mg<2   N: dysphagia 1 pureed nectar consistency fluid, low fat   D: lovenox F: lactate ringers  E: replete if K<4, Mg<2   N: dysphagia 1 pureed nectar consistency fluid, low fat   D: lovenox  dispo: RMF

## 2021-08-26 NOTE — SWALLOW VFSS/MBS ASSESSMENT ADULT - PHARYNGEAL PHASE COMMENTS
Initiation of pharyngeal swallow response appeared to be generally timely.  Superior laryngeal movement, anterior hyoid excursion, epiglottic inversion, and base of tongue to posterior pharyngeal wall contraction were partially reduced, resulting in deep penetration of thin liquid and transient or trace penetration with nectar-thick liquid.  Cued cough/throat clearing was weak and delayed, and did not completely clear penetrated material.  There was eventual silent aspiration of penetrated thin liquid material.  There was also mild post-deglutitive vallecular and pyriform sinus residue across trials, reduced with liquid wash or secondary swallows.

## 2021-08-26 NOTE — DIETITIAN INITIAL EVALUATION ADULT. - OTHER CALCULATIONS
Based on Standards of Care pt within % IBW thus actual body weight used for all calculations. Needs adjusted for advanced age. Based on Standards of Care pt within % IBW thus actual body weight used for all calculations. Needs adjusted for advanced age and Parkinson's Disease.

## 2021-08-26 NOTE — PROGRESS NOTE ADULT - PROBLEM SELECTOR PLAN 2
- Restarted carbidopa-levodopa  - F/u Speech and swallow eval (pt had difficulty with bedside swallow on large amounts of water)  - Neuro recs appreciated: MR brain ordered, f/u MRI brain - Restarted carbidopa-levodopa  - Neuro recs appreciated: MR brain ordered, f/u MRI brain

## 2021-08-26 NOTE — DIETITIAN INITIAL EVALUATION ADULT. - PERTINENT LABORATORY DATA
Sodium, Serum: 140 mmol/L  Potassium, Serum: 3.6 mmol/L  Chloride, Serum: 105 mmol/L  BUN, Serum: 18 mg/dL  Creatinine, Serum: 1.23 mg/dL  Glucose, Serum: 101 mg/dL (Elevated)  Calcium, Serum: 9.2 mg/dL  Alkaline Phosphatase, Serum: 343 U/L (Elevated)  ALT/SGPT: 124 U/L (Elevated)  Magnesium, Serum: 2.0 mg/dL  Phosphorus, Serum: 1.9 mg/dL (Low)    Last HbA1c 5.0% (8/25)

## 2021-08-26 NOTE — PROGRESS NOTE ADULT - PROBLEM SELECTOR PLAN 3
Confirmed on RUQ U/S as patient presented with elevated LFTs, lipase 1065, amylase 625, c/f gallstone pancreatitis  Abd U/S showing normal CBD size and no e/o cholecystitis.  LFTs downtrending  Alk phos increasing  No RUQ pain per pt although poor historian  No abdominal tenderness on palpation, rebound tenderness or guarding   CT abd/pelvis negative for acute pancreatitis   Plan:  - continue IV hydration w/ LR @ 50 cc/hr  - GI consulted, recs appreciated - elevated LFTs downtrending possible gallstone passed  -

## 2021-08-26 NOTE — SWALLOW VFSS/MBS ASSESSMENT ADULT - ORAL PHASE COMMENTS
As noted, pt with reduced oral bolus acceptance requiring encouragement to accept po trials.  (+) oral bolus holding with puree, facilitated by presentation of blank spoon.  (+) lingual tremor and slowed/disorganized lingual motion resulting in reduced oral bolus formation and transport and collection of residue on lingual surface after the swallow across trials.

## 2021-08-26 NOTE — SWALLOW VFSS/MBS ASSESSMENT ADULT - SLP GENERAL OBSERVATIONS
Pt awake and alert, confused and mildly agitated.  Pt refusing and pushing away po trials presented, required max encouragement and prompting to participate in the study.

## 2021-08-26 NOTE — PROGRESS NOTE ADULT - SUBJECTIVE AND OBJECTIVE BOX
GASTROENTEROLOGY PROGRESS NOTE  Patient seen and examined at bedside. No acute events o/n. No complaints this AM. Going for MBS.     PERTINENT REVIEW OF SYSTEMS:  Unable to obtain given mental status.     Allergies    No Known Allergies    Intolerances      MEDICATIONS:  MEDICATIONS  (STANDING):  aspirin enteric coated 81 milliGRAM(s) Oral daily  atorvastatin 80 milliGRAM(s) Oral at bedtime  carbidopa/levodopa  10/100 1 Tablet(s) Oral every 6 hours  clopidogrel Tablet 75 milliGRAM(s) Oral daily  enoxaparin Injectable 40 milliGRAM(s) SubCutaneous every 24 hours  lactated ringers. 1000 milliLiter(s) (50 mL/Hr) IV Continuous <Continuous>  thiamine IVPB 500 milliGRAM(s) IV Intermittent every 8 hours    MEDICATIONS  (PRN):    Vital Signs Last 24 Hrs  T(C): 36.2 (26 Aug 2021 11:20), Max: 36.7 (25 Aug 2021 14:04)  T(F): 97.2 (26 Aug 2021 11:20), Max: 98.1 (25 Aug 2021 14:04)  HR: 90 (26 Aug 2021 05:26) (63 - 90)  BP: 159/93 (26 Aug 2021 05:26) (105/58 - 165/94)  BP(mean): 119 (26 Aug 2021 05:26) (78 - 121)  RR: 23 (26 Aug 2021 05:26) (17 - 23)  SpO2: 98% (26 Aug 2021 05:26) (96% - 98%)     @  @ 07:00  --------------------------------------------------------  IN: 200 mL / OUT: 600 mL / NET: -400 mL     @  @ 12:34  --------------------------------------------------------  IN: 275 mL / OUT: 250 mL / NET: 25 mL      PHYSICAL EXAM:    General: in no acute distress  HEENT: MMM, conjunctiva and sclera clear  Gastrointestinal: Soft non-tender non-distended; No rebound or guarding  Skin: Warm and dry. No obvious rash    LABS:                        13.2   7.54  )-----------( 187      ( 26 Aug 2021 06:15 )             41.2     08-26    140  |  105  |  18  ----------------------------<  101<H>  3.6   |  24  |  1.23    Ca    9.2      26 Aug 2021 06:15  Phos  1.9     -  Mg     2.0         TPro  6.8  /  Alb  3.6  /  TBili  1.5<H>  /  DBili  x   /  AST  107<H>  /  ALT  124<H>  /  AlkPhos  343<H>      PT/INR - ( 24 Aug 2021 17:53 )   PT: 11.6 sec;   INR: 0.97          PTT - ( 24 Aug 2021 17:53 )  PTT:28.0 sec      Urinalysis Basic - ( 24 Aug 2021 18:43 )    Color: Yellow / Appearance: Clear / S.015 / pH: x  Gluc: x / Ketone: NEGATIVE  / Bili: Small / Urobili: 0.2 E.U./dL   Blood: x / Protein: NEGATIVE mg/dL / Nitrite: POSITIVE   Leuk Esterase: NEGATIVE / RBC: < 5 /HPF / WBC < 5 /HPF   Sq Epi: x / Non Sq Epi: 0-5 /HPF / Bacteria: Present /HPF                Culture - Blood (collected 24 Aug 2021 21:52)  Source: .Blood Blood-Peripheral  Preliminary Report (25 Aug 2021 22:00):    No growth at 1 day.    Culture - Blood (collected 24 Aug 2021 21:52)  Source: .Blood Blood-Peripheral  Preliminary Report (25 Aug 2021 22:00):    No growth at 1 day.    Culture - Urine (collected 24 Aug 2021 20:00)  Source: Clean Catch Clean Catch (Midstream)  Final Report (25 Aug 2021 14:50):    Insignificant amount of mixed growth.      RADIOLOGY & ADDITIONAL STUDIES:  Reviewed

## 2021-08-27 ENCOUNTER — TRANSCRIPTION ENCOUNTER (OUTPATIENT)
Age: 81
End: 2021-08-27

## 2021-08-27 VITALS — SYSTOLIC BLOOD PRESSURE: 168 MMHG | HEART RATE: 83 BPM | DIASTOLIC BLOOD PRESSURE: 66 MMHG

## 2021-08-27 LAB
ALBUMIN SERPL ELPH-MCNC: 3.3 G/DL — SIGNIFICANT CHANGE UP (ref 3.3–5)
ALP SERPL-CCNC: 244 U/L — HIGH (ref 40–120)
ALT FLD-CCNC: 77 U/L — HIGH (ref 10–45)
ANION GAP SERPL CALC-SCNC: 8 MMOL/L — SIGNIFICANT CHANGE UP (ref 5–17)
AST SERPL-CCNC: 62 U/L — HIGH (ref 10–40)
BILIRUB DIRECT SERPL-MCNC: <0.2 MG/DL — SIGNIFICANT CHANGE UP (ref 0–0.2)
BILIRUB INDIRECT FLD-MCNC: SIGNIFICANT CHANGE UP MG/DL (ref 0.2–1)
BILIRUB SERPL-MCNC: 0.8 MG/DL — SIGNIFICANT CHANGE UP (ref 0.2–1.2)
BUN SERPL-MCNC: 14 MG/DL — SIGNIFICANT CHANGE UP (ref 7–23)
CALCIUM SERPL-MCNC: 8.6 MG/DL — SIGNIFICANT CHANGE UP (ref 8.4–10.5)
CHLORIDE SERPL-SCNC: 107 MMOL/L — SIGNIFICANT CHANGE UP (ref 96–108)
CO2 SERPL-SCNC: 23 MMOL/L — SIGNIFICANT CHANGE UP (ref 22–31)
CREAT SERPL-MCNC: 1.03 MG/DL — SIGNIFICANT CHANGE UP (ref 0.5–1.3)
GLUCOSE SERPL-MCNC: 81 MG/DL — SIGNIFICANT CHANGE UP (ref 70–99)
HCT VFR BLD CALC: 38 % — LOW (ref 39–50)
HGB BLD-MCNC: 12.1 G/DL — LOW (ref 13–17)
MAGNESIUM SERPL-MCNC: 2 MG/DL — SIGNIFICANT CHANGE UP (ref 1.6–2.6)
MCHC RBC-ENTMCNC: 30 PG — SIGNIFICANT CHANGE UP (ref 27–34)
MCHC RBC-ENTMCNC: 31.8 GM/DL — LOW (ref 32–36)
MCV RBC AUTO: 94.1 FL — SIGNIFICANT CHANGE UP (ref 80–100)
NRBC # BLD: 0 /100 WBCS — SIGNIFICANT CHANGE UP (ref 0–0)
PHOSPHATE SERPL-MCNC: 2.8 MG/DL — SIGNIFICANT CHANGE UP (ref 2.5–4.5)
PLATELET # BLD AUTO: 150 K/UL — SIGNIFICANT CHANGE UP (ref 150–400)
POTASSIUM SERPL-MCNC: 4.2 MMOL/L — SIGNIFICANT CHANGE UP (ref 3.5–5.3)
POTASSIUM SERPL-SCNC: 4.2 MMOL/L — SIGNIFICANT CHANGE UP (ref 3.5–5.3)
PROT SERPL-MCNC: 6.2 G/DL — SIGNIFICANT CHANGE UP (ref 6–8.3)
RBC # BLD: 4.04 M/UL — LOW (ref 4.2–5.8)
RBC # FLD: 12.9 % — SIGNIFICANT CHANGE UP (ref 10.3–14.5)
SODIUM SERPL-SCNC: 138 MMOL/L — SIGNIFICANT CHANGE UP (ref 135–145)
WBC # BLD: 5.5 K/UL — SIGNIFICANT CHANGE UP (ref 3.8–10.5)
WBC # FLD AUTO: 5.5 K/UL — SIGNIFICANT CHANGE UP (ref 3.8–10.5)

## 2021-08-27 PROCEDURE — 71045 X-RAY EXAM CHEST 1 VIEW: CPT

## 2021-08-27 PROCEDURE — 87040 BLOOD CULTURE FOR BACTERIA: CPT

## 2021-08-27 PROCEDURE — 80061 LIPID PANEL: CPT

## 2021-08-27 PROCEDURE — 87635 SARS-COV-2 COVID-19 AMP PRB: CPT

## 2021-08-27 PROCEDURE — 70450 CT HEAD/BRAIN W/O DYE: CPT | Mod: MA

## 2021-08-27 PROCEDURE — 85730 THROMBOPLASTIN TIME PARTIAL: CPT

## 2021-08-27 PROCEDURE — 85027 COMPLETE CBC AUTOMATED: CPT

## 2021-08-27 PROCEDURE — 83690 ASSAY OF LIPASE: CPT

## 2021-08-27 PROCEDURE — 83036 HEMOGLOBIN GLYCOSYLATED A1C: CPT

## 2021-08-27 PROCEDURE — 84132 ASSAY OF SERUM POTASSIUM: CPT

## 2021-08-27 PROCEDURE — 81001 URINALYSIS AUTO W/SCOPE: CPT

## 2021-08-27 PROCEDURE — 84295 ASSAY OF SERUM SODIUM: CPT

## 2021-08-27 PROCEDURE — 85610 PROTHROMBIN TIME: CPT

## 2021-08-27 PROCEDURE — 80307 DRUG TEST PRSMV CHEM ANLYZR: CPT

## 2021-08-27 PROCEDURE — 80048 BASIC METABOLIC PNL TOTAL CA: CPT

## 2021-08-27 PROCEDURE — 84436 ASSAY OF TOTAL THYROXINE: CPT

## 2021-08-27 PROCEDURE — 83605 ASSAY OF LACTIC ACID: CPT

## 2021-08-27 PROCEDURE — 74230 X-RAY XM SWLNG FUNCJ C+: CPT

## 2021-08-27 PROCEDURE — 87086 URINE CULTURE/COLONY COUNT: CPT

## 2021-08-27 PROCEDURE — 70498 CT ANGIOGRAPHY NECK: CPT | Mod: MA

## 2021-08-27 PROCEDURE — 82962 GLUCOSE BLOOD TEST: CPT

## 2021-08-27 PROCEDURE — 82746 ASSAY OF FOLIC ACID SERUM: CPT

## 2021-08-27 PROCEDURE — 80076 HEPATIC FUNCTION PANEL: CPT

## 2021-08-27 PROCEDURE — 70496 CT ANGIOGRAPHY HEAD: CPT | Mod: MA

## 2021-08-27 PROCEDURE — 86850 RBC ANTIBODY SCREEN: CPT

## 2021-08-27 PROCEDURE — 76705 ECHO EXAM OF ABDOMEN: CPT

## 2021-08-27 PROCEDURE — 83735 ASSAY OF MAGNESIUM: CPT

## 2021-08-27 PROCEDURE — 86901 BLOOD TYPING SEROLOGIC RH(D): CPT

## 2021-08-27 PROCEDURE — 0042T: CPT

## 2021-08-27 PROCEDURE — 86900 BLOOD TYPING SEROLOGIC ABO: CPT

## 2021-08-27 PROCEDURE — 82607 VITAMIN B-12: CPT

## 2021-08-27 PROCEDURE — 99291 CRITICAL CARE FIRST HOUR: CPT | Mod: 25

## 2021-08-27 PROCEDURE — 74177 CT ABD & PELVIS W/CONTRAST: CPT

## 2021-08-27 PROCEDURE — 93005 ELECTROCARDIOGRAM TRACING: CPT

## 2021-08-27 PROCEDURE — 86769 SARS-COV-2 COVID-19 ANTIBODY: CPT

## 2021-08-27 PROCEDURE — 82140 ASSAY OF AMMONIA: CPT

## 2021-08-27 PROCEDURE — 99239 HOSP IP/OBS DSCHRG MGMT >30: CPT

## 2021-08-27 PROCEDURE — 82330 ASSAY OF CALCIUM: CPT

## 2021-08-27 PROCEDURE — 85025 COMPLETE CBC W/AUTO DIFF WBC: CPT

## 2021-08-27 PROCEDURE — 82150 ASSAY OF AMYLASE: CPT

## 2021-08-27 PROCEDURE — 84443 ASSAY THYROID STIM HORMONE: CPT

## 2021-08-27 PROCEDURE — 99292 CRITICAL CARE ADDL 30 MIN: CPT

## 2021-08-27 PROCEDURE — 84100 ASSAY OF PHOSPHORUS: CPT

## 2021-08-27 PROCEDURE — 84484 ASSAY OF TROPONIN QUANT: CPT

## 2021-08-27 PROCEDURE — 82803 BLOOD GASES ANY COMBINATION: CPT

## 2021-08-27 PROCEDURE — 36415 COLL VENOUS BLD VENIPUNCTURE: CPT

## 2021-08-27 PROCEDURE — 92611 MOTION FLUOROSCOPY/SWALLOW: CPT

## 2021-08-27 PROCEDURE — 80053 COMPREHEN METABOLIC PANEL: CPT

## 2021-08-27 PROCEDURE — 99232 SBSQ HOSP IP/OBS MODERATE 35: CPT | Mod: GC

## 2021-08-27 PROCEDURE — 97161 PT EVAL LOW COMPLEX 20 MIN: CPT

## 2021-08-27 PROCEDURE — 36000 PLACE NEEDLE IN VEIN: CPT

## 2021-08-27 RX ORDER — ATORVASTATIN CALCIUM 80 MG/1
1 TABLET, FILM COATED ORAL
Qty: 0 | Refills: 0 | DISCHARGE
Start: 2021-08-27

## 2021-08-27 RX ORDER — LISINOPRIL 2.5 MG/1
5 TABLET ORAL EVERY 24 HOURS
Refills: 0 | Status: DISCONTINUED | OUTPATIENT
Start: 2021-08-27 | End: 2021-08-27

## 2021-08-27 RX ORDER — CLOPIDOGREL BISULFATE 75 MG/1
1 TABLET, FILM COATED ORAL
Qty: 30 | Refills: 0
Start: 2021-08-27

## 2021-08-27 RX ORDER — QUETIAPINE FUMARATE 200 MG/1
1 TABLET, FILM COATED ORAL
Qty: 0 | Refills: 0 | DISCHARGE

## 2021-08-27 RX ADMIN — Medication 100 MILLIGRAM(S): at 11:55

## 2021-08-27 RX ADMIN — Medication 81 MILLIGRAM(S): at 11:55

## 2021-08-27 RX ADMIN — LISINOPRIL 5 MILLIGRAM(S): 2.5 TABLET ORAL at 12:58

## 2021-08-27 RX ADMIN — DESVENLAFAXINE 50 MILLIGRAM(S): 50 TABLET, EXTENDED RELEASE ORAL at 12:17

## 2021-08-27 RX ADMIN — CARBIDOPA AND LEVODOPA 1 TABLET(S): 25; 100 TABLET ORAL at 16:14

## 2021-08-27 RX ADMIN — ENOXAPARIN SODIUM 40 MILLIGRAM(S): 100 INJECTION SUBCUTANEOUS at 09:59

## 2021-08-27 RX ADMIN — CARBIDOPA AND LEVODOPA 1 TABLET(S): 25; 100 TABLET ORAL at 06:44

## 2021-08-27 RX ADMIN — Medication 1 TABLET(S): at 11:55

## 2021-08-27 RX ADMIN — CARBIDOPA AND LEVODOPA 1 TABLET(S): 25; 100 TABLET ORAL at 10:03

## 2021-08-27 RX ADMIN — CLOPIDOGREL BISULFATE 75 MILLIGRAM(S): 75 TABLET, FILM COATED ORAL at 11:55

## 2021-08-27 NOTE — DISCHARGE NOTE PROVIDER - NSDCFUADDAPPT_GEN_ALL_CORE_FT
Please call the Neurologist, Dr. Alyssa Thayer's office to schedule an outpatient brain MRI and follow up appointment with her.    Please also make an appointment with your primary care provider in one to two weeks.    If you would also like to see the Gastroenterologist you saw you here, Dr. Heath Antonio, regarding possible gallbladder surgery, please contact his office at the above number.

## 2021-08-27 NOTE — PROGRESS NOTE ADULT - REASON FOR ADMISSION
altered mental status

## 2021-08-27 NOTE — CONSULT NOTE ADULT - SUBJECTIVE AND OBJECTIVE BOX
Patient is a 80y old  Male who presents with a chief complaint of altered mental status (27 Aug 2021 06:39)       HPI:  80y Male with PMHx of Parkinson's disease, multiple falls, HTN, depression, and anxiety presents to the ED with altered mental status. Collateral obtained by ED from pt's wife who was no longer present upon ICU consult arrival. Per patient's wife, patient was in his usual state of health last night when he became more confused at 2100 asking where family members were that no longer lived with them. The patient's wife also states that pt usually manages his own oral medications, but last night he expressed that he might need help moving forward. The patient asked his wife sometime between 0200 and 0400 for pepto bismal which he may have taken, but it was unwitnessed. After this encounter, the patient went back to sleep, and he did not wake up for the rest of the day despite his wife attempting to wake him. There have been reported increased falls over the past few months and was recently started on 25mg seroquel at home. No reported melena, hematochezia, N/V, hematemesis.    ED vitals: T Afeb   HR 50-60s   RR 18  /94-->164/82  SpO2 98  Labs signficant: No WBC, no lactate, no electrolyte abnormalities. Elevated bilirubin to 3.3, AST//377  Imaging/EKG: Sinus luis eduardo with 1 AVB, no ischemic changes   Interventions: 1L NS  (24 Aug 2021 23:10)      PAST MEDICAL & SURGICAL HISTORY:  Parkinson&#x27;s disease    Hypertension        MEDICATIONS  (STANDING):  aspirin enteric coated 81 milliGRAM(s) Oral daily  atorvastatin 80 milliGRAM(s) Oral at bedtime  carbidopa/levodopa  10/100 1 Tablet(s) Oral every 6 hours  clopidogrel Tablet 75 milliGRAM(s) Oral daily  desvenlafaxine ER 50 milliGRAM(s) Oral every 24 hours  enoxaparin Injectable 40 milliGRAM(s) SubCutaneous every 24 hours  lactated ringers. 1000 milliLiter(s) (50 mL/Hr) IV Continuous <Continuous>  multivitamin 1 Tablet(s) Oral daily  thiamine 100 milliGRAM(s) Oral daily    MEDICATIONS  (PRN):        Social History:                -  Home Living Status:  lives with his wife in a private house         -  Prior Home Care Services:   none          Baseline Functional Level Prior to Admission:             - ADL's/ IADL's:  wife assists         - ambulatory status PTA:  walked with a cnae    FAMILY HISTORY:      CBC Full  -  ( 27 Aug 2021 08:44 )  WBC Count : 5.50 K/uL  RBC Count : 4.04 M/uL  Hemoglobin : 12.1 g/dL  Hematocrit : 38.0 %  Platelet Count - Automated : 150 K/uL  Mean Cell Volume : 94.1 fl  Mean Cell Hemoglobin : 30.0 pg  Mean Cell Hemoglobin Concentration : 31.8 gm/dL  Auto Neutrophil # : x  Auto Lymphocyte # : x  Auto Monocyte # : x  Auto Eosinophil # : x  Auto Basophil # : x  Auto Neutrophil % : x  Auto Lymphocyte % : x  Auto Monocyte % : x  Auto Eosinophil % : x  Auto Basophil % : x      08-27    138  |  107  |  14  ----------------------------<  81  4.2   |  23  |  1.03    Ca    8.6      27 Aug 2021 08:44  Phos  2.8     08-27  Mg     2.0     08-27    TPro  6.2  /  Alb  3.3  /  TBili  0.8  /  DBili  <0.2  /  AST  62<H>  /  ALT  77<H>  /  AlkPhos  244<H>  08-27            Radiology:    < from: Xray Chest 1 View- PORTABLE-Urgent (08.24.21 @ 19:07) >  EXAM:  XR CHEST PORTABLE URGENT 1V                          PROCEDURE DATE:  08/24/2021          INTERPRETATION:  TECHNIQUE: Single portable view of the chest.    COMPARIISON:  None    CLINICAL HISTORY: Stroke Code    FINDINGS:    Single frontal view of the chest demonstrates the lungs to be clear. The cardiomediastinal silhouette is enlarged. No acute osseous abnormalities. Overlying EKG leads and wires are noted    IMPRESSION: No acute cardiopulmonary disease process. Cardiomegaly.        < from: CT Brain Stroke Protocol (08.24.21 @ 19:14) >  EXAM:  CT BRAIN STROKE PROTOCOL                          PROCEDURE DATE:  08/24/2021          INTERPRETATION:  PROCEDURE: CT head without intravenous contrast    CLINICAL INDICATION: Lethargy, stroke code    TECHNIQUE: Multiple axial images were obtained and viewed at 5 mm intervals from the skull base to the vertex. The images were reviewed in brain and bone windows. Sagittal and coronal reformations are provided. Rapid-AI software is utilized for preliminary hemorrhage detection.    COMPARISON: None    FINDINGS:    Lateral and third ventricles are prominent in size for the age of the patient, and there is particularly bulging appearance of the third ventricle, 1.8 cm diameter on axial image 17. The temporal horns are not rounded, however,and findings may be due to central atrophy. This is furthermore suggested in the presence of long-standing small vessel ischemic change of moderate degree and periventricular white matter as well as scattered subcortical locations.    Gray-white matter differentiation appears intact without recent transcortical infarct. There is no acute intracranial hemorrhage, mass effect or midline shift. No extra-axial fluid collection.    Bony window images show no calvarial fracture. Mastoid air cells appear grossly clear. There is chronic left sphenoid sinusitis, completely opacified, and the remaining paranasal sinuses are clear.    IMPRESSION:    No acute intracranial hemorrhage, mass effect or CT evidence of recent transcortical infarct.    Long-standing small vessel ischemic change and periventricular cerebral white matter, likely associated central-predominant atrophy and ventriculomegaly. Communicating hydrocephalus is not fully excluded, especially without any prior study for comparison, andfurther clinical assessment is advised. Chronic left sphenoid sinusitis.      CT images are time-stamped at 1800H on 08/24/2021 and the above findings were discussed with EWA Parra at 1800H.        < from: CT Angio Head w/ IV Cont (08.24.21 @ 18:15) >  EXAM:  CT ANGIO BRAIN (W)AW IC                          EXAM:  CT ANGIO NECK (W)AW IC                          PROCEDURE DATE:  08/24/2021          INTERPRETATION:  IValdemar MD, have reviewed the images and the report and agree with the preliminary findings below, BUT with the following ADDENDED CORRECTIONS z/ MODIFICATION(S):      1. Intracranial CTA shows abrupt lack of contrast filling in the right posterior cerebral artery at the proximal P2 segment for a span of approximately 4 mm (series 8, images 51-52). Filling defect appearance is suggestive of a recent thromboembolic event.    Furthermore, the left posterior cerebral artery P2 segment shows a focal site of moderate there are severe stenosis, likely atherosclerotic in nature.    Also correction to below: There is NO hemodynamically significant stenosis of the internal carotid arteries or its or circulation. Also, the left vertebral artery intracranially and the left PICA fill with contrast, presumably in retrograde supply.    2. Extracranial CTA: Origin of the left vertebral artery is not well seen, especially in the presence of adjacent and more dense incoming venous and craniad-refluxing contrast. However, there is patency seen of the left vertebral artery of the more distal V1 segment, and throughout its V2 and V3 segments, but very faint and discontinuous comparison the more robustly patent right vertebral artery. Greater contrast density is seen distally, suggesting a retrograde source of flow, especially to the left V4 segment and PICA as mentioned above.    Both common carotid arteries, internal carotid arteries, and the right vertebral artery show no site of occlusion, dissection injury nor significant stenosis. There is mild calcified plaque at the left greater than right carotid bifurcations.    Incidental: There is high density material along the posterior surface of the oral tongue; correlate for ingested calcium/barium/magnesium/antacid?      PRELIMINARY RESIDENT REPORT:        PROCEDURE: CTA brain with intravenous contrast.    INDICATION: Stroke code. Altered mental status.    TECHNIQUE: Multiple axial thin section were obtained through the [place] of [place] following the intravenous bolus injection of 80 ml of Optiray-350. MIP series are provided.    COMPARISON: None.    FINDINGS:    The internal carotid arteries are patent at the skull base and intracranial compartment without occlusion or high grade stenosis. Mildly calcified right cavernous and supraclinoid right internal carotid artery contributing to mild hemodynamic stenosis of the right supraclinoid internal carotid artery. Mild calcified and noncalcified plaque within the left cavernous and supraclinoid internal carotid artery without hemodynamically significant stenosis.    The anterior and middle cerebral arteries are patent at their 1st and 2nd order segments, and appear symmetric in caliber.    The posterior circulation shows no high grade stenosis or occlusion.    The intracranial vertebral arteries, the basilar artery and both posterior cerebral arteries are patent. Fetal origin of the right PCA.    There is no site of aneurysm within the resolution limitations of CTA.    IMPRESSION: Mild hemodynamic stenosis of the right supraclinoid internal carotid artery.      PROCEDURE: CTA neck with intravenous contrast.    INDICATION: Stroke code. Altered mental status.    TECHNIQUE: Multiple axial thin section were obtained through the neck following the intravenous bolus injection of Optiray-350 as above. MIP series are provided.    COMPARISON: None.    FINDINGS:    The aortic arch is normal size and shows patency, with normal 3 vessel configuration.    There is severe tortuosity of the right common carotid artery at its origin. There is minimal calcified plaque within the right carotid bifurcation. There is moderate calcified noncalcified plaque within the left carotid bifurcation without hemodynamically significant stenosis. Both common carotid arteries are patent up to the bifurcations.    Theright internal carotid artery is patent up to the skull base, without hemodynamically significant stenosis or occlusion.    The left internal carotid artery is patent up to the skull base, without hemodynamically significant stenosis or occlusion.    The right cervical vertebral artery is patent throughout. The left cervical vertebral artery is severely narrowed throughout. This could represent a combination of underlying underdevelopment with superimposed atherosclerotic disease.    IMPRESSION: The left cervical vertebral artery is severely narrowed throughout, which could represent a combination of underlying development with superimposed atherosclerotic disease.                Vital Signs Last 24 Hrs  T(C): 36.8 (27 Aug 2021 04:35), Max: 36.8 (27 Aug 2021 04:35)  T(F): 98.3 (27 Aug 2021 04:35), Max: 98.3 (27 Aug 2021 04:35)  HR: 72 (27 Aug 2021 04:35) (72 - 76)  BP: 166/82 (27 Aug 2021 04:35) (120/60 - 166/82)  BP(mean): 81 (26 Aug 2021 16:30) (81 - 98)  RR: 19 (27 Aug 2021 04:35) (18 - 19)  SpO2: 95% (27 Aug 2021 04:35) (95% - 97%)        REVIEW OF SYSTEMS: AMS        Physical Exam:   81 yo  gentleman lying in semi Cadena's position, awake/alert, no acute complaints    Neurologic Exam:    Alert and oriented x 1 to person, speech hypophonic fluent w/o dysarthria, follows commands    Motor Exam:    Right UE:            no focal weakness > 3+/5    Left UE:              no focal weakness > 3+/5    Right LE:            no focal weakness > 3+/5    Left LE:              no focal weakness > 3+/5               Sensation:           intact to light touch x 4 extremities                                                DTR:                  biceps/brachioradialis: equal bilaterally                                                     patella/ankle: equal bilaterally                                                   neg clonus                           neg Babinski                          Gait:  not tested        PM&R Impression:    1) AMS  2) deconditioned  3) no focal weakness    Recommendations/ Plan :    1) Physical therapy focusing on therapeutic exercises, bed mobility/transfer out of bed evaluation, progressive ambulation with assistive devices prn.    2) Anticipated Disposition Plan/Recs:   pending functional progress

## 2021-08-27 NOTE — DISCHARGE NOTE PROVIDER - HOSPITAL COURSE
#Discharge: do not delete  80y Male with PMHx of Parkinson's disease (on cinamet), multiple falls, HTN, depression, and anxiety  presents to the ED with altered mental status. Per wife, patient complained of abdominal pain night PTA, when he took pepto bismol (given by wife) and then went to sleep, but was unarousable the next AM (only responsive to painful stimuli). Wife notes pt manages his own meds and has also reported increased falls over the past few months. Pt was also recently started on 25mg seroquel at home for depression. Of note, patient reported auditory and visual hallucinations of his mother for the past 6 weeks. In ED, /94 which decreased without any intervention to 164/82. 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 Only pertinent labs in ED were elevated LFTS AST//377 and elevated bili 3.3. Pt s/p 1L NS in ED. ICU consulted for c/f of encephalopathy i/s/o medication overuse or overdose. Pt admitted to [facility]. Neuro consulted for c/f stroke/intracranial pathology (no concern for seizures), recommended MRI, ordered but not yet performed. Psych consulted w/ c/f depression, recommends holding seroquel (1 dose already given), and to restart desvenlafaxine. Gi consulted for elevated LFTs/c/f gallstone pancreatitis, recommended CTAbdpel (which was negative for gallstones) with no acute intervention currently. Speech and swallow evaluated, pt underwent modified barium swallow recommending pureed diet (nectar, lowfat). Patient now AAOx2, stable for stepdown to [facility].     Hospital course (by problem):     #AMS (altered mental status)  Patient presented with altered mental status. Per wife, patient's mental status is back to baseline. Likely hospital delirium i/s/o parkinson's dementia. Other possible etiologies include medication interaction w/ or w/o EtOh vs medication misuse/overdose. Given negative lactate low s/f for convulsive seizure but c/f non-convulsive leading to post-ictal state. B12, folate WNL. CTH negative for stroke, hemorrhage, or mass effect.   CT neck s/f L cervical vertebral neck artery narrowing     Plan  - F/u RPR  - Restarted carbidopa-levodopa   - Neuro recs appreciated: MR brain ordered, f/u MRI brain  - CIWA checks  - daily thiamine and multivitamin  - continue aspirin 81 mg daily, clopidogrel 75 mg daily, and atorvastatin 80 mg daily.     Problem/Plan - 2:  ·  Problem: Parkinson's disease.   ·  Plan: - Restarted carbidopa-levodopa  - Neuro recs appreciated: MR brain ordered, f/u MRI brain.     Problem/Plan - 3:  ·  Problem: Gallstones.   ·  Plan: Confirmed on RUQ U/S as patient presented with elevated LFTs, lipase 1065, amylase 625, c/f gallstone pancreatitis  Abd U/S showing normal CBD size and no e/o cholecystitis.  LFTs downtrending  Alk phos increasing  No RUQ pain per pt although poor historian  No abdominal tenderness on palpation, rebound tenderness or guarding   CT abd/pelvis negative for acute pancreatitis   Plan:  - continue IV hydration w/ LR @ 50 cc/hr  - GI consulted, recs appreciated - elevated LFTs downtrending possible gallstone passed  -.     Problem/Plan - 4:  ·  Problem: Depression.   ·  Plan: - Home meds desvenlafaxine  Plan:  - restarted desvenlafaxine 8/26  - Psych recs appreciated  - Holding seroquel 25mg per psych recs (last dose 8/25 PM).     Problem/Plan - 5:  ·  Problem: Hypertension.   ·  Plan: Home meds losartan 5mg  Losartan held due to TOIBN (increase in Cr of 20% to 1.23 from 1.09 on admission)  Plan:  Hold losartan in setting of TOBIN  restart losartan if BP increases.    Patient was discharged to: (home/[facility]/acute rehab/hospice, etc, and with what services – home health PT/RN? Home O2?)    New medications:   Changes to old medications:  Medications that were stopped:    Items to follow up as outpatient:    Physical exam at the time of discharge:       #Discharge: do not delete  80y Male with PMHx of Parkinson's disease (on carbidopa/levodopa), multiple falls, HTN, depression (recently started on seroquel), and anxiety presented with altered mental status and abdominal pain. Stroke code was called and CTH negative for stroke but showed small vessel changes and ventriculomegaly, CT brain showed left cervical vertebral artery is severely narrowed throughout. CTA showed filling defect suggestive of recent thromboembolic event. Patient placed on plavix 75 mg daily. Neuro service recommended MRI brain for further workup that can be done as outpatient. Also, AST//377, elevated bili 3.3, and lipase 1065. GI was consulted, CT A/P done, which was negative for gallstone pancreatitis. It is possible that patient passed a stone as AST/ALT down trending and pain is improving.  Psych consulted w/ c/f depression, recommends holding seroquel (1 dose already given), and to restart desvenlafaxine. Speech and swallow evaluated, pt underwent modified barium swallow recommending pureed diet (nectar, lowfat).     Hospital course (by problem):     #AMS (altered mental status)  Patient presented with altered mental status. Per wife, patient's mental status is back to baseline. Likely etiologies include medication interaction w/ Etoh, medication misuse/accidental overdose, seizure and stroke.  B12, folate WNL. CTH negative for stroke, hemorrhage, or mass effect.   CT neck s/f L cervical vertebral neck artery narrowing     Plan  - F/u RPR  - Restarted carbidopa-levodopa   - Neuro recs appreciated: MR brain ordered, f/u MRI brain  - CIWA checks  - daily thiamine and multivitamin  - continue aspirin 81 mg daily, clopidogrel 75 mg daily, and atorvastatin 80 mg daily.     Problem/Plan - 2:  ·  Problem: Parkinson's disease.   ·  Plan: - Restarted carbidopa-levodopa  - Neuro recs appreciated: MR brain ordered, f/u MRI brain.     Problem/Plan - 3:  ·  Problem: Gallstones.   ·  Plan: Confirmed on RUQ U/S as patient presented with elevated LFTs, lipase 1065, amylase 625, c/f gallstone pancreatitis  Abd U/S showing normal CBD size and no e/o cholecystitis.  LFTs downtrending  Alk phos increasing  No RUQ pain per pt although poor historian  No abdominal tenderness on palpation, rebound tenderness or guarding   CT abd/pelvis negative for acute pancreatitis   Plan:  - continue IV hydration w/ LR @ 50 cc/hr  - GI consulted, recs appreciated - elevated LFTs downtrending possible gallstone passed  -.     Problem/Plan - 4:  ·  Problem: Depression.   ·  Plan: - Home meds desvenlafaxine  Plan:  - restarted desvenlafaxine 8/26  - Psych recs appreciated  - Holding seroquel 25mg per psych recs (last dose 8/25 PM).     Problem/Plan - 5:  ·  Problem: Hypertension.   ·  Plan: Home meds losartan 5mg  Losartan held due to TOBIN (increase in Cr of 20% to 1.23 from 1.09 on admission)  Plan:  Hold losartan in setting of TOBIN  restart losartan if BP increases.    Patient was discharged to: (home/[facility]/acute rehab/hospice, etc, and with what services – home health PT/RN? Home O2?)    New medications:   Changes to old medications:  Medications that were stopped:    Items to follow up as outpatient:    Physical exam at the time of discharge:       #Discharge: do not delete  80y Male with PMHx of Parkinson's disease (on carbidopa/levodopa), multiple falls, HTN, depression (recently started on seroquel), and anxiety presented with altered mental status and abdominal pain. Stroke code was called and CTH negative for stroke but showed small vessel changes and ventriculomegaly, CT brain showed left cervical vertebral artery is severely narrowed throughout. CTA showed filling defect suggestive of recent thromboembolic event. Patient placed on plavix 75 mg daily. Neuro service recommended MRI brain for further workup that can be done as outpatient. Also, AST//377, elevated bili 3.3, and lipase 1065. GI was consulted, CT A/P done, which was negative for gallstone pancreatitis. It is possible that patient passed a stone as AST/ALT down trending and pain is improving.  Psych consulted w/ c/f depression, recommends holding seroquel (1 dose already given), and to restart desvenlafaxine. Speech and swallow evaluated, pt underwent modified barium swallow recommending pureed diet (nectar, lowfat).     Hospital course (by problem):     #AMS (altered mental status)  Patient presented with altered mental status. Per wife, patient's mental status is back to baseline. Likely etiologies include medication interaction w/ Etoh, medication misuse/accidental overdose, seizure and stroke. B12, folate WNL. CTH negative for stroke but showed small vessel changes and ventriculomegaly, CT brain showed left cervical vertebral artery is severely narrowed throughout. CTA showed filling defect suggestive of recent thromboembolic event. Patient placed on plavix 75 mg daily. Neuro service recommended MRI brain for further workup that can be done as outpatient and f/u with Dr. Thayer. Patient intermittently delirious, but not suspecting etoh withdrawal. Thiamine and multivitamin given.  -c/w clopidogrel 75 mg daily, aspirin 81 mg daily, and atorvastatin 80 mg daily.    #Parkinson's disease  -c/w carbidopa-levodopa and f/u with outpatient neurologist     #Suspected gallstone pancreatitis  AST//377, elevated bili 3.3, and lipase 1065. GI was consulted, CT A/P done, which was negative for gallstone pancreatitis. It is possible that patient passed a stone as AST/ALT down trending and pain is improving.   Patient given IVF. F/U outpatient with GI Dr. Antonio or primary care provider.    #Depression   Restarted home desvenlafaxine 8/26, holding seroquel 25mg per psych recs (last dose 8/25 PM).    #Hypertension  -continue with home lisinopril    Patient was discharged to: Home    New medications: Clopidogrel 75mg daily  Changes to old medications: None  Medications that were stopped: None    Items to follow up as outpatient: MRI with Neurologist Dr. Thayer    Physical exam at the time of discharge: Baseline mental status AOx3 currently. Slightly anxious and tremulous.

## 2021-08-27 NOTE — PROGRESS NOTE ADULT - SUBJECTIVE AND OBJECTIVE BOX
GASTROENTEROLOGY PROGRESS NOTE  Patient seen and examined at bedside. No abd pain, n/v.     PERTINENT REVIEW OF SYSTEMS:  CONSTITUTIONAL: No weakness, fevers or chills  HEENT: No visual changes; No vertigo or throat pain   GASTROINTESTINAL: As above.  NEUROLOGICAL: No numbness or weakness  SKIN: No itching, burning, rashes, or lesions     Allergies    No Known Allergies    Intolerances      MEDICATIONS:  MEDICATIONS  (STANDING):  aspirin enteric coated 81 milliGRAM(s) Oral daily  atorvastatin 80 milliGRAM(s) Oral at bedtime  carbidopa/levodopa  10/100 1 Tablet(s) Oral every 6 hours  clopidogrel Tablet 75 milliGRAM(s) Oral daily  desvenlafaxine ER 50 milliGRAM(s) Oral every 24 hours  enoxaparin Injectable 40 milliGRAM(s) SubCutaneous every 24 hours  lactated ringers. 1000 milliLiter(s) (50 mL/Hr) IV Continuous <Continuous>  multivitamin 1 Tablet(s) Oral daily  thiamine 100 milliGRAM(s) Oral daily    MEDICATIONS  (PRN):    Vital Signs Last 24 Hrs  T(C): 36.8 (27 Aug 2021 04:35), Max: 36.8 (27 Aug 2021 04:35)  T(F): 98.3 (27 Aug 2021 04:35), Max: 98.3 (27 Aug 2021 04:35)  HR: 72 (27 Aug 2021 04:35) (72 - 76)  BP: 166/82 (27 Aug 2021 04:35) (120/60 - 166/82)  BP(mean): 81 (26 Aug 2021 16:30) (81 - 98)  RR: 19 (27 Aug 2021 04:35) (18 - 19)  SpO2: 95% (27 Aug 2021 04:35) (95% - 97%)    08-26 @ 07:01  -  08-27 @ 07:00  --------------------------------------------------------  IN: 575 mL / OUT: 250 mL / NET: 325 mL      PHYSICAL EXAM:    General: in no acute distress  HEENT: MMM, conjunctiva and sclera clear  Gastrointestinal: Soft non-tender non-distended; No rebound or guarding  Skin: Warm and dry. No obvious rash    LABS:                        12.1   5.50  )-----------( 150      ( 27 Aug 2021 08:44 )             38.0     08-27    138  |  107  |  14  ----------------------------<  81  4.2   |  23  |  1.03    Ca    8.6      27 Aug 2021 08:44  Phos  2.8     08-27  Mg     2.0     08-27    TPro  6.2  /  Alb  3.3  /  TBili  0.8  /  DBili  <0.2  /  AST  62<H>  /  ALT  77<H>  /  AlkPhos  244<H>  08-27                      Culture - Blood (collected 24 Aug 2021 21:52)  Source: .Blood Blood-Peripheral  Preliminary Report (26 Aug 2021 22:00):    No growth at 2 days.    Culture - Blood (collected 24 Aug 2021 21:52)  Source: .Blood Blood-Peripheral  Preliminary Report (26 Aug 2021 22:00):    No growth at 2 days.    Culture - Urine (collected 24 Aug 2021 20:00)  Source: Clean Catch Clean Catch (Midstream)  Final Report (25 Aug 2021 14:50):    Insignificant amount of mixed growth.      RADIOLOGY & ADDITIONAL STUDIES:  Reviewed

## 2021-08-27 NOTE — DISCHARGE NOTE NURSING/CASE MANAGEMENT/SOCIAL WORK - NSDCPEFALRISK_GEN_ALL_CORE
For information on Fall & injury Prevention, visit [email or web address]

## 2021-08-27 NOTE — DISCHARGE NOTE NURSING/CASE MANAGEMENT/SOCIAL WORK - NSDCVIVACCINE_GEN_ALL_CORE_FT
Tdap; 27-Jul-2021 17:11; Rocio Troncoso (RN); [employer]; [id number] (Exp. Date: 28-Jan-2023); IntraMuscular; Deltoid Left.; 0.5 milliLiter(s); VIS (VIS Published: 09-May-2013, VIS Presented: 27-Jul-2021);

## 2021-08-27 NOTE — PROGRESS NOTE ADULT - PROVIDER SPECIALTY LIST ADULT
Hospitalist
Internal Medicine
Neurology
Gastroenterology
Internal Medicine
Gastroenterology
Internal Medicine
Internal Medicine

## 2021-08-27 NOTE — PHYSICAL THERAPY INITIAL EVALUATION ADULT - PERTINENT HX OF CURRENT PROBLEM, REHAB EVAL
80y Male with PMHx of Parkinson's disease, multiple falls, HTN, depression, and anxiety presents to the ED with altered mental status. Collateral obtained by ED from pt's wife who was no longer present upon ICU consult arrival. Per patient's wife, patient was in his usual state of health last night when he became more confused at 2100 asking where family members were that no longer lived with them.

## 2021-08-27 NOTE — PROGRESS NOTE ADULT - SUBJECTIVE AND OBJECTIVE BOX
Patient was seen and examined by me at bedside. I agree with resident's note, subjective, objective physical exam, assessment and plan with following modifications/additions.    Greater than 35 minutes spent on total encounter; more than 50% of the visit was spent counseling and/or coordinating care by the attending physician.    80y Male with PMHx of Parkinson's disease, multiple falls, HTN, depression, and anxiety presents to the ED with abd pain with lipase and lft elevation 2/2 passed gallstone and   altered mental status with CVA rule out. MS now at baseline intact neurologic exam, abd pain resolved no acute interventions per GI with repeat imaging stable, patient and his wife prefer patient to be home at this time for further workup as outpt- with PCP and neuro f/u. Will continue     Je Valladares MD [phone]  Patient was seen and examined by me at bedside. I agree with resident's note, subjective, objective physical exam, assessment and plan with following modifications/additions.    Greater than 35 minutes spent on total encounter; more than 50% of the visit was spent counseling and/or coordinating care by the attending physician.    80y Male with PMHx of Parkinson's disease, multiple falls, HTN, depression, and anxiety presents to the ED with abd pain with lipase and lft elevation 2/2 passed gallstone and   altered mental status with CVA rule out. MS now at baseline intact neurologic exam, abd pain resolved no acute interventions per GI with repeat imaging stable, patient and his wife prefer patient to be home at this time for further workup as outpt- with PCP and neuro f/u. Will continue dapt after reviewing with neuro given intracranial stenosis, defer MRI at this time after discussion with family.     Je Valladares MD [phone]  Patient was seen and examined by me at bedside. I agree with resident's note, subjective, objective physical exam, assessment and plan with following modifications/additions.    Greater than 35 minutes spent on total encounter; more than 50% of the visit was spent counseling and/or coordinating care by the attending physician.    80y Male with PMHx of Parkinson's disease, multiple falls, HTN, depression, and anxiety presents to the ED with abd pain with lipase and lft elevation 2/2 passed gallstone and   altered mental status with CVA rule out. MS now at baseline intact neurologic exam, abd pain resolved no acute interventions per GI with repeat imaging stable. Patient and his wife prefer patient to be home at this time for further workup as outpt- with PCP and neuro f/u.  #Intracranial disease on CTA, stroke team involved- will continue dapt after reviewing with neuro given intracranial stenosis, defer MRI at this time after discussion with family  #Passed stone likely, now asymptomatic- discussed with wife need for possible gallbladder surgery as may have been gallstone pancreatitis and CBD stone even though unlikely present now, prefers to monitor for now     Je Valladares MD [phone]

## 2021-08-27 NOTE — DISCHARGE NOTE PROVIDER - NSDCCPCAREPLAN_GEN_ALL_CORE_FT
PRINCIPAL DISCHARGE DIAGNOSIS  Diagnosis: Parkinson's disease  Assessment and Plan of Treatment:       SECONDARY DISCHARGE DIAGNOSES  Diagnosis: Gallstones  Assessment and Plan of Treatment:      PRINCIPAL DISCHARGE DIAGNOSIS  Diagnosis: Parkinson's disease  Assessment and Plan of Treatment: You came to us with altered mental status, possibly related to your underlying Parkinson's or the medications used to treat it. We would like you to follow up with the Neurologist Dr. Thayer to schedule an MRI of your brain to follow up on an abnormality seen on CT scan that may be indicative of a small stroke. We are prescribing a blood thinner called clopidogrel to prevent further strokes.  Please take Clopidogrel 75 mg daily in addition to your regular medications.      SECONDARY DISCHARGE DIAGNOSES  Diagnosis: Gallstones  Assessment and Plan of Treatment: As we discussed, you may have had a gallstone which caused your abdominal pain. Please follow up with your primary care provider to discuss further interventions.

## 2021-08-27 NOTE — PROGRESS NOTE ADULT - ASSESSMENT
80y Male with PMHx of Parkinson's disease, multiple falls, HTN, depression, and anxiety presents to the ED with altered mental status. GI consulted for r/o gallstone pancreatitis.     #R/o gallstone pancreatitis  - patient does have some mild mariano-umbilical pain and an elevated lipase  - CT abd w/ IV contrast shows no pancreaticobiliary pathology   - liver tests elevated, but downtrending  - it is possible that he passed a stone and thus he is improving  - for now, would continue to monitor for pain and trend liver tests    Thank you for allowing us to participate in the care of this patient.  GI will sign off. Please call back with any questions or concerns.     Jennie Beyer MD  PGY-5, Gastroenterology Fellow  pager: [phone]

## 2021-08-27 NOTE — CONSULT NOTE ADULT - ASSESSMENT
per Internal Medicine    81 yo Male with PMHx of Parkinson's disease, multiple falls, HTN, depression, and anxiety presents to the ED with altered mental status.     Problem/Plan - 1:  ·  Problem: AMS (altered mental status).   ·  Plan: Strong suspicion for medication interaction w/ or w/o EtOh, vs overuse vs intentional overdose  Pt drank 2-3 glasses of red wine night before admission (typical usage)  Given negative lactate low s/f for convulsive seizure but c/f non-convulsive leading to post-ictal state.   Lower s/f for intraabdominal pathology causing encephalopathy given lack of SIRS.   TSH, B12, folate wnl  CTH negative for stroke, hemorrhage, or mass effect  CT neck s/f L cervical vertebral neck artery narrowing     Plan  - F/u RPR  - Restarted carbidopa-levodopa   - Neuro recs appreciated: MR brain ordered, f/u MRI brain  - CIWA checks  - daily thiamine and multivitamin  - continue aspirin 81 mg daily, clopidogrel 75 mg daily, and atorvastatin 80 mg daily.    Problem/Plan - 2:  ·  Problem: Parkinson's disease.   ·  Plan: - Restarted carbidopa-levodopa  - Neuro recs appreciated: MR brain ordered, f/u MRI brain.    Problem/Plan - 3:  ·  Problem: Gallstones.   ·  Plan: Confirmed on RUQ U/S as patient presented with elevated LFTs, lipase 1065, amylase 625, c/f gallstone pancreatitis  Abd U/S showing normal CBD size and no e/o cholecystitis.  LFTs downtrending  Alk phos increasing  No RUQ pain per pt although poor historian  No abdominal tenderness on palpation, rebound tenderness or guarding   CT abd/pelvis negative for acute pancreatitis   Plan:  - continue IV hydration w/ LR @ 50 cc/hr  - GI consulted, recs appreciated - elevated LFTs downtrending possible gallstone passed  -.    Problem/Plan - 4:  ·  Problem: Depression.   ·  Plan: - Home meds desvenlafaxine  Plan:  - restarted desvenlafaxine 8/26  - Psych recs appreciated  - Holding seroquel 25mg per psych recs (last dose 8/25 PM).    Problem/Plan - 5:  ·  Problem: Hypertension.   ·  Plan: Home meds losartan 5mg  Losartan held due to TOBIN (increase in Cr of 20% to 1.23 from 1.09 on admission)  Plan:  Hold losartan in setting of TOBIN  restart losartan if BP increases.    Problem/Plan - 6:  ·  Problem: Nutrition, metabolism, and development symptoms.   ·  Plan: F: lactate ringers  E: replete if K<4, Mg<2   N: dysphagia 1 pureed nectar consistency fluid, low fat   D: lovenox  dispo: RMF.

## 2021-08-27 NOTE — PROVIDER CONTACT NOTE (OTHER) - BACKGROUND
Patient's wife says patient usually takes lisinopril at home and has not been getting it here; MD made aware

## 2021-08-27 NOTE — PHYSICAL THERAPY INITIAL EVALUATION ADULT - ADDITIONAL COMMENTS
Pt lives with his wife in an apartment, no JOSE.  Pt reports he ambulates with a SC.  Pt's wife reports the pt has had several falls in the last 6 months but could not provide a number.

## 2021-08-27 NOTE — DISCHARGE NOTE PROVIDER - NSDCMRMEDTOKEN_GEN_ALL_CORE_FT
aspirin 81 mg oral tablet: orally once a day  carbidopa-levodopa 10 mg-100 mg oral tablet: 1 tab(s) orally 4 times a day  desvenlafaxine (as base) 50 mg oral tablet, extended release:   lisinopril 5 mg oral tablet: 1 tab(s) orally once a day  LORazepam 0.5 mg oral tablet:   SEROquel 25 mg oral tablet: 1 tab(s) orally once a day (at bedtime)   aspirin 81 mg oral tablet: orally once a day  atorvastatin 80 mg oral tablet: 1 tab(s) orally once a day (at bedtime)  carbidopa-levodopa 10 mg-100 mg oral tablet: 1 tab(s) orally 4 times a day  clopidogrel 75 mg oral tablet: 1 tab(s) orally once a day  desvenlafaxine (as base) 50 mg oral tablet, extended release:   lisinopril 5 mg oral tablet: 1 tab(s) orally once a day

## 2021-08-27 NOTE — DISCHARGE NOTE PROVIDER - CARE PROVIDER_API CALL
Alyssa Thayer)  Neurology  [address] [place] [address]  Phone: [phone]  Fax: [phone]  Follow Up Time:     Heath Antonio  GASTROENTEROLOGY  [address] [place], [place] [address]  Phone: [phone]  Fax: [phone]  Follow Up Time:

## 2021-08-29 LAB
CULTURE RESULTS: SIGNIFICANT CHANGE UP
CULTURE RESULTS: SIGNIFICANT CHANGE UP
SPECIMEN SOURCE: SIGNIFICANT CHANGE UP
SPECIMEN SOURCE: SIGNIFICANT CHANGE UP

## 2021-09-02 DIAGNOSIS — R74.01 ELEVATION OF LEVELS OF LIVER TRANSAMINASE LEVELS: ICD-10-CM
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
## 2021-09-02 DIAGNOSIS — F10.10 ALCOHOL ABUSE, UNCOMPLICATED: ICD-10-CM

## 2021-09-02 DIAGNOSIS — F41.9 ANXIETY DISORDER, UNSPECIFIED: ICD-10-CM

## 2021-09-02 DIAGNOSIS — F05 DELIRIUM DUE TO KNOWN PHYSIOLOGICAL CONDITION: ICD-10-CM

## 2021-09-02 DIAGNOSIS — K80.20 CALCULUS OF GALLBLADDER WITHOUT CHOLECYSTITIS WITHOUT OBSTRUCTION: ICD-10-CM

## 2021-09-02 DIAGNOSIS — I10 ESSENTIAL (PRIMARY) HYPERTENSION: ICD-10-CM

## 2021-09-02 DIAGNOSIS — R47.1 DYSARTHRIA AND ANARTHRIA: ICD-10-CM

## 2021-09-02 DIAGNOSIS — G20 PARKINSON'S DISEASE: ICD-10-CM

## 2021-09-02 DIAGNOSIS — R29.717 NIHSS SCORE 17: ICD-10-CM

## 2021-09-02 DIAGNOSIS — R00.1 BRADYCARDIA, UNSPECIFIED: ICD-10-CM

## 2021-09-02 DIAGNOSIS — E80.7 DISORDER OF BILIRUBIN METABOLISM, UNSPECIFIED: ICD-10-CM

## 2021-09-02 DIAGNOSIS — G92 TOXIC ENCEPHALOPATHY: ICD-10-CM

## 2021-09-02 DIAGNOSIS — T43.595A ADVERSE EFFECT OF OTHER ANTIPSYCHOTICS AND NEUROLEPTICS, INITIAL ENCOUNTER: ICD-10-CM

## 2021-09-02 DIAGNOSIS — R41.82 ALTERED MENTAL STATUS, UNSPECIFIED: ICD-10-CM

## 2021-09-02 DIAGNOSIS — Z72.89 OTHER PROBLEMS RELATED TO LIFESTYLE: ICD-10-CM

## 2021-09-02 DIAGNOSIS — T42.4X1A POISONING BY BENZODIAZEPINES, ACCIDENTAL (UNINTENTIONAL), INITIAL ENCOUNTER: ICD-10-CM

## 2021-09-03 PROBLEM — Z00.00 ENCOUNTER FOR PREVENTIVE HEALTH EXAMINATION: Status: ACTIVE | Noted: 2021-09-03

## 2021-09-03 PROBLEM — G20 PARKINSON'S DISEASE: Chronic | Status: ACTIVE | Noted: 2021-08-24

## 2021-09-03 PROBLEM — I10 ESSENTIAL (PRIMARY) HYPERTENSION: Chronic | Status: ACTIVE | Noted: 2021-08-24

## 2022-03-24 ENCOUNTER — APPOINTMENT (OUTPATIENT)
Dept: NEUROLOGY | Facility: CLINIC | Age: 82
End: 2022-03-24
Payer: MEDICARE

## 2022-03-24 VITALS
SYSTOLIC BLOOD PRESSURE: 144 MMHG | DIASTOLIC BLOOD PRESSURE: 82 MMHG | HEIGHT: 68 IN | HEART RATE: 81 BPM | WEIGHT: 165 LBS | OXYGEN SATURATION: 97 % | BODY MASS INDEX: 25.01 KG/M2 | TEMPERATURE: 98.2 F

## 2022-03-24 DIAGNOSIS — G20 PARKINSON'S DISEASE: ICD-10-CM

## 2022-03-24 PROCEDURE — 99204 OFFICE O/P NEW MOD 45 MIN: CPT

## 2022-03-24 RX ORDER — CARBIDOPA AND LEVODOPA 25; 100 MG/1; MG/1
25-100 TABLET, EXTENDED RELEASE ORAL
Qty: 30 | Refills: 5 | Status: ACTIVE | COMMUNITY
Start: 2022-03-24 | End: 1900-01-01

## 2022-03-24 NOTE — HISTORY OF PRESENT ILLNESS
[FreeTextEntry1] : Patient is seeing me for management of PD, which he has had for 10 years. \par \par He first symptom was a left hand tremor. He has been on LD from the onset with good response. Currently take 1 tab four times per day (q4hrs). Higher doses caused dyskinesia in his legs. \par He is unaware of LD kinetics\par He does all ADLs independently albeit slowly. Needs some assistance with dressing\par He is not doing do PT\par There have been no falls or FOG. Uses a cane for balance\par Tremor is manageable at this time\par Has difficulty turning in bed at night and sleeps often during the day\par \par PCP: Dr. Chopra [phone]\par [facility]\[place]\par [address]\par [address]\par [place]\par [address]\par \par nonmotor:\par sleep is fragmented: gets 6hrs per night. \par develop visual hallucinations last year (i.e. saw his mother in the room) and started on quetiapine. Takes 1/4 tab AM and hs with good benefit\par + constipation managed with miralax. BM qod\par hx of anxiety/depression. Use of ativan in the past caused hospitalization for oversedation. Currently gets occ anxiety attacks and does not see a psychiatrist\par - dysphagia \par \par PMH: HTN, HLD\par Surgeries: b/l Achilles heel repair. \par \par PD meds\par sinemet  1 tab q4hrs\par seroquel 25mg 1/4 tab BID\par

## 2022-03-24 NOTE — DISCUSSION/SUMMARY
[FreeTextEntry1] : PD x 10 years who is motorically stable but has daytime drowsiness and overnight hypokinesia\par \par Patient was counseled on the following recommendations:\par \par - add sinemet ER  1 tab qhs\par - cont sinemet  1 tab qid\par - cont seroquel 1/4 tab BID\par - trial of melatonin 1-2mg qhs\par - counseled on drinking a cup of coffee in the morning\par - refer for home PT\par \par f/u 3months

## 2022-03-24 NOTE — PHYSICAL EXAM
[FreeTextEntry1] : 2+ masking. Hypophonic with mild tachyphemia. There is a 2+ left hand tremor with no PT. There is mild L>R bradykinesia with 2= rigidity at the wrists. Pushes to stand and walks with good SL and turns. No FOG. posture is stooped. Postural stability intact

## 2022-07-28 ENCOUNTER — APPOINTMENT (OUTPATIENT)
Dept: NEUROLOGY | Facility: CLINIC | Age: 82
End: 2022-07-28

## 2022-07-28 VITALS
HEART RATE: 82 BPM | OXYGEN SATURATION: 96 % | WEIGHT: 165 LBS | TEMPERATURE: 98 F | SYSTOLIC BLOOD PRESSURE: 146 MMHG | DIASTOLIC BLOOD PRESSURE: 71 MMHG | HEIGHT: 68 IN | BODY MASS INDEX: 25.01 KG/M2

## 2022-07-28 DIAGNOSIS — R13.10 DYSPHAGIA, UNSPECIFIED: ICD-10-CM

## 2022-07-28 PROCEDURE — 99214 OFFICE O/P EST MOD 30 MIN: CPT

## 2022-07-28 NOTE — HISTORY OF PRESENT ILLNESS
[FreeTextEntry1] : Patient is seeing me for management of PD, which he has had for 10 years. \par \par He first symptom was a left hand tremor. He has been on LD from the onset with good response. Currently take 1 tab four times per day (q4hrs). Higher doses caused dyskinesia in his legs. \par ====================\par - tried ER sinemet for a few days without a diffrence. He sleeps well during the night\par - Doesnt want to drink coffee without helping his fatigue. \par - sleep 11:30pm till 8:30am. yes vivid dreams. moves in sleep less often now. used to scream and talk in sleep. \par - Confused in the morning thining hes going for a trip which resolves in 20 minutes \par - Constipated. BM every 2nds day. takes polyethylene glycol power x2weeks\par - no issues in urniation \par - no falls or freezing\par develop visual hallucinations last year (i.e. saw his mother in the room) and started on quetiapine. Takes 1/4 tab AM and hs with good benefit. no hallucinations\par hx of anxiety/depression. Use of ativan in the past caused hospitalization for oversedation. Currently gets occ anxiety attacks and does not see a psychiatrist\par - micrographia\par - dysphagia. coughs while eating with any food. sometimes with water saw a speech therapist years a go and and had an abnormal flap which doesn’t close. \par - not motivated. \par - doing PT now, doing well but doesn’t like it?\par \par PMH: HTN, HLD\par Surgeries: b/l Achilles heel repair. \par \par PD meds\par sinemet  1 tab q4hrs. 8am, 12pm, 4pm, 8pm\par seroquel 25mg 1/4 tab BID 8am and 8pm

## 2022-07-28 NOTE — END OF VISIT
[] : Fellow [FreeTextEntry3] : Patient with increased dysphagia. Has some morning confusion lasting 30minutes out of sleep with episodes of having difficulty distinguishing dreams from reality. Agree with plan outlined above and will consider trial of exelon when he returns from [place] [Time Spent: ___ minutes] : I have spent [unfilled] minutes of time on the encounter.

## 2022-07-28 NOTE — PHYSICAL EXAM
[FreeTextEntry1] : 2+ masking. Hypophonic with mild tachyphemia. There is a 2+ left hand tremor with no PT. There is mild-mod L>R bradykinesia with 2= rigidity at the wrists. Pushes to stand and walks with good SL and turns. No FOG. posture is stooped. Postural stability intact

## 2022-07-28 NOTE — DISCUSSION/SUMMARY
[FreeTextEntry1] : PD x 10 years who is stable with motor stability and slightly worse RBD sx's. He has daytime confusion with trouble orientation after he wakes up lasting 30 minutes. He has tried Sinemet ER at night for trouble turning and fatigue in the morning without effect. Overall he does not notice any wearing off and previous increases in sinemet to 1.5 tabs made him have dyskinesias in legs. At this point we will keep the same LD regiment. \par \par Patient was counseled on the following recommendations:\par - cont sinemet 10-100mg 1 tab qid\par - DC morning seroquel 1/4 tab and continue just night time with consideration to stop all together given such small dose and no hallucinations since 1 years ago. \par - trial of melatonin 1-3mg qhs for RBD sx's\par - continue home PT\par - referred to SLP for subacute worsening of chronic dysphagia \par \par f/u 3months\par \par Case discussed and pt examined with movement attending Dr. Roque\par \par Jose Raul Veliz MD\par Movement Fellow

## 2023-01-01 ENCOUNTER — APPOINTMENT (OUTPATIENT)
Dept: NEUROLOGY | Facility: CLINIC | Age: 83
End: 2023-01-01
Payer: MEDICARE

## 2023-01-01 VITALS
HEART RATE: 80 BPM | TEMPERATURE: 98 F | OXYGEN SATURATION: 95 % | BODY MASS INDEX: 25.61 KG/M2 | HEIGHT: 64 IN | SYSTOLIC BLOOD PRESSURE: 103 MMHG | DIASTOLIC BLOOD PRESSURE: 52 MMHG | WEIGHT: 150 LBS

## 2023-01-01 PROCEDURE — 99214 OFFICE O/P EST MOD 30 MIN: CPT

## 2023-01-01 RX ORDER — ESCITALOPRAM OXALATE 10 MG/1
10 TABLET ORAL DAILY
Qty: 90 | Refills: 3 | Status: ACTIVE | COMMUNITY
Start: 2023-01-01 | End: 1900-01-01

## 2023-01-01 RX ORDER — CARBIDOPA AND LEVODOPA 10; 100 MG/1; MG/1
10-100 TABLET ORAL
Qty: 360 | Refills: 3 | Status: ACTIVE | COMMUNITY
Start: 2023-01-01 | End: 1900-01-01

## 2023-01-01 RX ORDER — RAMELTEON 8 MG/1
8 TABLET ORAL
Qty: 30 | Refills: 6 | Status: ACTIVE | COMMUNITY
Start: 2023-01-01 | End: 1900-01-01

## 2023-07-27 NOTE — DISCUSSION/SUMMARY
[FreeTextEntry1] : patient is 82 year old male here for follow up for PD [ diagnosed more than 10 years ago] with new onset c/o dysphagia, fatigue and anxiety . Since the last visit wife feels he is getting slower and stiffer and the medication does not seem to helping. Also having trouble with anxiety affecting daily activities. \par \par Patient was counseled on the following recommendations:\par - Increase Sinemet 10-100mg increase to  : 1.5 tab at 8a-12a-4p- 1tab at 8p\par - start lexapro 5mg at daily for anxiety\par - referral for home based physical therapy and SLP \par - modified barium swallow\par -consider discontinuing the morning dose of Seroquel as that may be contributing to the day time sleepiness\par \par RTC in 3  month\par \par Susie Burnette MD\par Movement Disorder Fellow \par \par Patient discussed with \par \par f/u 3months\par \par Case discussed and pt examined with movement attending Dr. Roque\par \par Jose Raul Veliz MD\par Movement Fellow

## 2023-07-27 NOTE — HISTORY OF PRESENT ILLNESS
[FreeTextEntry1] : Patient is seeing me for management of PD, which he has had for 10 years. \par \par interval hx:\par patients wife states since the last visit he has declined. he gets fatigues easily, and voice is getting softer.he has new concerns of dysphagia\par wife feels he s getting slower and more anxious\par \par motor:\par tremor:\par denies\par  balance: denies any falls, walks with a cane\par Speech/ swallow:complains of dysphagia  to both solid and liquid\par \par \par non motor:\par constipation: every other day\par Sleep:sleeps well , denies RBD\par Hallucination: better than before, on Quetiapine 1/4 tab twice a day\par memory:ok\par mood:ok\par \par exercise:\par  not doing physical therapy or exercise at the moment \par  \par \par \par PMH: HTN, HLD\par Surgeries: b/l Achilles heel repair. \par \par PD meds\par sinemet  1 tab q4hrs. 8am, 12pm, 4pm, 8pm\par seroquel 25mg 1/4 tab BID 8am and 8pm\par did not try the Sinemet 25/100 ER

## 2023-07-27 NOTE — END OF VISIT
[] : Fellow [FreeTextEntry3] : Patient with advanced PD who was last seen 1 yr ago. Takes sinemet  1 tab qid and seroquel 25mg 1/4 tab BID. His Slowness has increased and uses a walker to ambulate. He takes twice as long to do ADLs which he needs assistance with. Sleeps several hours during the day. Hallucinations are controlled along with RBD. Has worsened dysphagia to solids and liquids. Anxiety has also increased and occurs daily--can be incapacitating per his wife. He is not doing PT\par \par On exam, 3+ masking. Moderate hypophonia. Tremors absent. There is moderate L>R bradykinesia with 2+ rigidity. Pushes to stand and shuffles with 1+ person assist. No FoG or LID\par \par Imp: Patient with worsened bradykinesia, dysphagia, daytime somnolence, and anxiety\par Since he has been on sinemet  for years will increase the 1st 3 doses of the day to 1.5 tabs and cont 1 tab at 8PM\par eliminate AM dose of seroquel to see if morning sleepiness improves\par start lexapro 5mg qd for anxiety management\par referred for barium swallow and s/s therapy\par referred to PT\par \par Will see him back in 4 months [Time Spent: ___ minutes] : I have spent [unfilled] minutes of time on the encounter.

## 2023-07-27 NOTE — PHYSICAL EXAM
[FreeTextEntry1] : 2+ masking. Hypophonic with mild tachyphemia. There is a 2+ left hand tremor with no PT. There is mod L>R bradykinesia with 2= rigidity at the wrists.\par rigidity worse on left than right .Stooped posture, Pushes to stand and walks with good SL and turns. Postural stability intact

## 2024-04-04 ENCOUNTER — APPOINTMENT (OUTPATIENT)
Dept: NEUROLOGY | Facility: CLINIC | Age: 84
End: 2024-04-04